# Patient Record
Sex: MALE | Race: WHITE | NOT HISPANIC OR LATINO
[De-identification: names, ages, dates, MRNs, and addresses within clinical notes are randomized per-mention and may not be internally consistent; named-entity substitution may affect disease eponyms.]

---

## 2018-10-02 ENCOUNTER — APPOINTMENT (OUTPATIENT)
Dept: UROLOGY | Facility: CLINIC | Age: 63
End: 2018-10-02
Payer: COMMERCIAL

## 2018-10-02 VITALS
SYSTOLIC BLOOD PRESSURE: 144 MMHG | DIASTOLIC BLOOD PRESSURE: 80 MMHG | HEIGHT: 70 IN | BODY MASS INDEX: 34.36 KG/M2 | RESPIRATION RATE: 17 BRPM | TEMPERATURE: 98.3 F | HEART RATE: 76 BPM | WEIGHT: 240 LBS

## 2018-10-02 PROCEDURE — 99204 OFFICE O/P NEW MOD 45 MIN: CPT

## 2018-10-10 LAB
CORE LAB FLUID CYTOLOGY: NORMAL
PSA SERPL-MCNC: 0.05 NG/ML

## 2018-10-23 ENCOUNTER — APPOINTMENT (OUTPATIENT)
Dept: UROLOGY | Facility: CLINIC | Age: 63
End: 2018-10-23

## 2018-11-21 ENCOUNTER — APPOINTMENT (OUTPATIENT)
Dept: GASTROENTEROLOGY | Facility: CLINIC | Age: 63
End: 2018-11-21
Payer: COMMERCIAL

## 2018-11-21 VITALS
RESPIRATION RATE: 17 BRPM | BODY MASS INDEX: 36.22 KG/M2 | WEIGHT: 253 LBS | HEART RATE: 89 BPM | OXYGEN SATURATION: 95 % | HEIGHT: 70 IN | DIASTOLIC BLOOD PRESSURE: 86 MMHG | SYSTOLIC BLOOD PRESSURE: 135 MMHG | TEMPERATURE: 97.9 F

## 2018-11-21 DIAGNOSIS — Z86.010 PERSONAL HISTORY OF COLONIC POLYPS: ICD-10-CM

## 2018-11-21 DIAGNOSIS — Z82.49 FAMILY HISTORY OF ISCHEMIC HEART DISEASE AND OTHER DISEASES OF THE CIRCULATORY SYSTEM: ICD-10-CM

## 2018-11-21 PROCEDURE — 99244 OFF/OP CNSLTJ NEW/EST MOD 40: CPT

## 2018-12-12 ENCOUNTER — LABORATORY RESULT (OUTPATIENT)
Age: 63
End: 2018-12-12

## 2018-12-12 ENCOUNTER — APPOINTMENT (OUTPATIENT)
Dept: NEPHROLOGY | Facility: CLINIC | Age: 63
End: 2018-12-12
Payer: COMMERCIAL

## 2018-12-12 VITALS
DIASTOLIC BLOOD PRESSURE: 88 MMHG | SYSTOLIC BLOOD PRESSURE: 133 MMHG | HEART RATE: 81 BPM | HEIGHT: 70 IN | BODY MASS INDEX: 35.82 KG/M2 | OXYGEN SATURATION: 97 % | WEIGHT: 250.22 LBS

## 2018-12-12 VITALS — SYSTOLIC BLOOD PRESSURE: 130 MMHG | DIASTOLIC BLOOD PRESSURE: 80 MMHG

## 2018-12-12 DIAGNOSIS — I10 ESSENTIAL (PRIMARY) HYPERTENSION: ICD-10-CM

## 2018-12-12 DIAGNOSIS — R80.9 PROTEINURIA, UNSPECIFIED: ICD-10-CM

## 2018-12-12 DIAGNOSIS — R60.0 LOCALIZED EDEMA: ICD-10-CM

## 2018-12-12 DIAGNOSIS — N18.3 CHRONIC KIDNEY DISEASE, STAGE 3 (MODERATE): ICD-10-CM

## 2018-12-12 DIAGNOSIS — R31.29 OTHER MICROSCOPIC HEMATURIA: ICD-10-CM

## 2018-12-12 PROCEDURE — G0008: CPT | Mod: GC

## 2018-12-12 PROCEDURE — 90686 IIV4 VACC NO PRSV 0.5 ML IM: CPT | Mod: GC

## 2018-12-12 PROCEDURE — 99204 OFFICE O/P NEW MOD 45 MIN: CPT | Mod: 25,GC

## 2018-12-12 RX ORDER — EZETIMIBE 10 MG/1
10 TABLET ORAL
Refills: 0 | Status: ACTIVE | COMMUNITY

## 2018-12-12 RX ORDER — SODIUM PICOSULFATE, MAGNESIUM OXIDE, AND ANHYDROUS CITRIC ACID 10; 3.5; 12 MG/160ML; G/160ML; G/160ML
10-3.5-12 MG-GM LIQUID ORAL
Qty: 1 | Refills: 0 | Status: DISCONTINUED | COMMUNITY
Start: 2018-11-21 | End: 2018-12-12

## 2018-12-13 LAB
25(OH)D3 SERPL-MCNC: 19 NG/ML
ALBUMIN SERPL ELPH-MCNC: 3.8 G/DL
ANION GAP SERPL CALC-SCNC: 14 MMOL/L
APPEARANCE: CLEAR
BACTERIA: NEGATIVE
BASOPHILS # BLD AUTO: 0.02 K/UL
BASOPHILS NFR BLD AUTO: 0.3 %
BILIRUBIN URINE: NEGATIVE
BLOOD URINE: ABNORMAL
BUN SERPL-MCNC: 31 MG/DL
CALCIUM SERPL-MCNC: 9.5 MG/DL
CALCIUM SERPL-MCNC: 9.5 MG/DL
CHLORIDE SERPL-SCNC: 108 MMOL/L
CO2 SERPL-SCNC: 20 MMOL/L
COLOR: YELLOW
CREAT SERPL-MCNC: 1.48 MG/DL
CREAT SPEC-SCNC: 124 MG/DL
CREAT/PROT UR: 1.9 RATIO
EOSINOPHIL # BLD AUTO: 0.14 K/UL
EOSINOPHIL NFR BLD AUTO: 1.9 %
GLUCOSE QUALITATIVE U: NEGATIVE MG/DL
GLUCOSE SERPL-MCNC: 111 MG/DL
HCT VFR BLD CALC: 42.3 %
HGB BLD-MCNC: 13.7 G/DL
HYALINE CASTS: 3 /LPF
IMM GRANULOCYTES NFR BLD AUTO: 0.3 %
KETONES URINE: NEGATIVE
LEUKOCYTE ESTERASE URINE: NEGATIVE
LYMPHOCYTES # BLD AUTO: 1.84 K/UL
LYMPHOCYTES NFR BLD AUTO: 24.3 %
MAN DIFF?: NORMAL
MCHC RBC-ENTMCNC: 29.7 PG
MCHC RBC-ENTMCNC: 32.4 GM/DL
MCV RBC AUTO: 91.6 FL
MICROSCOPIC-UA: NORMAL
MONOCYTES # BLD AUTO: 0.56 K/UL
MONOCYTES NFR BLD AUTO: 7.4 %
NEUTROPHILS # BLD AUTO: 4.98 K/UL
NEUTROPHILS NFR BLD AUTO: 65.8 %
NITRITE URINE: NEGATIVE
PARATHYROID HORMONE INTACT: 71 PG/ML
PH URINE: 5.5
PHOSPHATE SERPL-MCNC: 3.7 MG/DL
PLATELET # BLD AUTO: 209 K/UL
POTASSIUM SERPL-SCNC: 4.4 MMOL/L
PROT UR-MCNC: 240 MG/DL
PROTEIN URINE: 300 MG/DL
RBC # BLD: 4.62 M/UL
RBC # FLD: 14.1 %
RED BLOOD CELLS URINE: 6 /HPF
SODIUM SERPL-SCNC: 142 MMOL/L
SPECIFIC GRAVITY URINE: 1.02
SQUAMOUS EPITHELIAL CELLS: 0 /HPF
UROBILINOGEN URINE: NEGATIVE MG/DL
WBC # FLD AUTO: 7.56 K/UL
WHITE BLOOD CELLS URINE: 1 /HPF

## 2018-12-17 ENCOUNTER — OTHER (OUTPATIENT)
Age: 63
End: 2018-12-17

## 2018-12-20 ENCOUNTER — FORM ENCOUNTER (OUTPATIENT)
Age: 63
End: 2018-12-20

## 2018-12-21 ENCOUNTER — APPOINTMENT (OUTPATIENT)
Dept: MRI IMAGING | Facility: CLINIC | Age: 63
End: 2018-12-21
Payer: COMMERCIAL

## 2018-12-21 ENCOUNTER — OUTPATIENT (OUTPATIENT)
Dept: OUTPATIENT SERVICES | Facility: HOSPITAL | Age: 63
LOS: 1 days | End: 2018-12-21
Payer: COMMERCIAL

## 2018-12-21 DIAGNOSIS — Z00.8 ENCOUNTER FOR OTHER GENERAL EXAMINATION: ICD-10-CM

## 2018-12-21 PROCEDURE — 70200 X-RAY EXAM OF EYE SOCKETS: CPT | Mod: 26

## 2018-12-21 PROCEDURE — A9585: CPT

## 2018-12-21 PROCEDURE — 74183 MRI ABD W/O CNTR FLWD CNTR: CPT

## 2018-12-21 PROCEDURE — 70200 X-RAY EXAM OF EYE SOCKETS: CPT

## 2018-12-21 PROCEDURE — 74183 MRI ABD W/O CNTR FLWD CNTR: CPT | Mod: 26

## 2018-12-21 PROCEDURE — 72197 MRI PELVIS W/O & W/DYE: CPT | Mod: 26

## 2018-12-21 PROCEDURE — 72197 MRI PELVIS W/O & W/DYE: CPT

## 2018-12-29 ENCOUNTER — APPOINTMENT (OUTPATIENT)
Dept: MRI IMAGING | Facility: IMAGING CENTER | Age: 63
End: 2018-12-29

## 2019-02-12 ENCOUNTER — APPOINTMENT (OUTPATIENT)
Dept: GASTROENTEROLOGY | Facility: HOSPITAL | Age: 64
End: 2019-02-12
Payer: COMMERCIAL

## 2019-02-12 ENCOUNTER — RESULT REVIEW (OUTPATIENT)
Age: 64
End: 2019-02-12

## 2019-02-12 ENCOUNTER — OUTPATIENT (OUTPATIENT)
Dept: OUTPATIENT SERVICES | Facility: HOSPITAL | Age: 64
LOS: 1 days | Discharge: HOME | End: 2019-02-12
Payer: COMMERCIAL

## 2019-02-12 VITALS
HEIGHT: 70 IN | OXYGEN SATURATION: 97 % | HEART RATE: 60 BPM | TEMPERATURE: 97 F | SYSTOLIC BLOOD PRESSURE: 136 MMHG | RESPIRATION RATE: 12 BRPM | DIASTOLIC BLOOD PRESSURE: 85 MMHG | WEIGHT: 253.09 LBS

## 2019-02-12 VITALS
HEART RATE: 62 BPM | DIASTOLIC BLOOD PRESSURE: 82 MMHG | OXYGEN SATURATION: 100 % | SYSTOLIC BLOOD PRESSURE: 150 MMHG | RESPIRATION RATE: 18 BRPM

## 2019-02-12 PROCEDURE — 45384 COLONOSCOPY W/LESION REMOVAL: CPT | Mod: 33

## 2019-02-12 PROCEDURE — 88305 TISSUE EXAM BY PATHOLOGIST: CPT | Mod: 26

## 2019-02-12 RX ORDER — SODIUM CHLORIDE 9 MG/ML
1000 INJECTION, SOLUTION INTRAVENOUS
Qty: 0 | Refills: 0 | Status: DISCONTINUED | OUTPATIENT
Start: 2019-02-12 | End: 2019-02-12

## 2019-02-12 RX ADMIN — SODIUM CHLORIDE 50 MILLILITER(S): 9 INJECTION, SOLUTION INTRAVENOUS at 11:36

## 2019-02-12 NOTE — ASU DISCHARGE PLAN (ADULT/PEDIATRIC) - NURSING INSTRUCTIONS
If you wear contact lenses, please refrain from wearing contact lenses for the next 24 hours to avoid corneal abrasion due to possible dry eyes from surgery. Wear corrective lenses. Otherwise, your eyes may be dry for the next 24 hours. DO NOT rub them. You may use eye drops to moisturize.

## 2019-02-12 NOTE — ASU PATIENT PROFILE, ADULT - PSH
H/O bilateral cataract extraction    Parotid gland enlargement  s/p removed 22 years ago  S/P eye surgery  right eye retina  S/P hernia repair    S/P prostatectomy

## 2019-02-12 NOTE — ASU DISCHARGE PLAN (ADULT/PEDIATRIC) - CALL YOUR DOCTOR IF YOU HAVE ANY OF THE FOLLOWING:
Bleeding that does not stop/Nausea and vomiting that does not stop/Increased irritability or sluggishness Nausea and vomiting that does not stop/Unable to urinate/Bleeding that does not stop/Fever greater than (need to indicate Fahrenheit or Celsius)/Increased irritability or sluggishness/Signs of infection: redness around surgical site, hot and tender to the touch, pus drainage of any kind accompanied by foul odor with a temperature of 101.0, or higher.

## 2019-02-12 NOTE — ASU DISCHARGE PLAN (ADULT/PEDIATRIC) - CONDITION AT DISCHARGE
Patient is stable and meets discharge criteria. Patient made aware that he/she must wait on unit to be escorted to awaiting car in front of the main building after being discharged by Anesthesia Department./Stable

## 2019-02-14 DIAGNOSIS — Z12.11 ENCOUNTER FOR SCREENING FOR MALIGNANT NEOPLASM OF COLON: ICD-10-CM

## 2019-02-14 DIAGNOSIS — K64.8 OTHER HEMORRHOIDS: ICD-10-CM

## 2019-02-14 DIAGNOSIS — D12.3 BENIGN NEOPLASM OF TRANSVERSE COLON: ICD-10-CM

## 2019-02-14 DIAGNOSIS — I12.9 HYPERTENSIVE CHRONIC KIDNEY DISEASE WITH STAGE 1 THROUGH STAGE 4 CHRONIC KIDNEY DISEASE, OR UNSPECIFIED CHRONIC KIDNEY DISEASE: ICD-10-CM

## 2019-02-14 DIAGNOSIS — N18.1 CHRONIC KIDNEY DISEASE, STAGE 1: ICD-10-CM

## 2019-02-14 DIAGNOSIS — Z90.79 ACQUIRED ABSENCE OF OTHER GENITAL ORGAN(S): ICD-10-CM

## 2019-02-14 DIAGNOSIS — D12.2 BENIGN NEOPLASM OF ASCENDING COLON: ICD-10-CM

## 2019-02-14 DIAGNOSIS — Z85.46 PERSONAL HISTORY OF MALIGNANT NEOPLASM OF PROSTATE: ICD-10-CM

## 2019-02-14 DIAGNOSIS — N39.3 STRESS INCONTINENCE (FEMALE) (MALE): ICD-10-CM

## 2019-02-14 DIAGNOSIS — E55.9 VITAMIN D DEFICIENCY, UNSPECIFIED: ICD-10-CM

## 2019-03-21 ENCOUNTER — RESULT REVIEW (OUTPATIENT)
Age: 64
End: 2019-03-21

## 2019-03-21 LAB
ALBUMIN MFR SERPL ELPH: 62 %
ALBUMIN SERPL-MCNC: 4.2 G/DL
ALBUMIN/GLOB SERPL: 1.7 RATIO
ALPHA1 GLOB MFR SERPL ELPH: 4.3 %
ALPHA1 GLOB SERPL ELPH-MCNC: 0.3 G/DL
ALPHA2 GLOB MFR SERPL ELPH: 12.4 %
ALPHA2 GLOB SERPL ELPH-MCNC: 0.8 G/DL
ANA SER IF-ACNC: NEGATIVE
ANCA AB SER-IMP: ABNORMAL
B-GLOBULIN MFR SERPL ELPH: 10.8 %
B-GLOBULIN SERPL ELPH-MCNC: 0.7 G/DL
B19V DNA FLD QL NAA+PROBE: NOT DETECTED IU/ML
C-ANCA SER-ACNC: NEGATIVE
C3 SERPL-MCNC: 148 MG/DL
C4 SERPL-MCNC: 31 MG/DL
DEPRECATED KAPPA LC FREE/LAMBDA SER: 1.69 RATIO
DSDNA AB SER-ACNC: <12 IU/ML
ENA SS-A AB SER IA-ACNC: <0.2 AL
ENA SS-B AB SER IA-ACNC: <0.2 AL
GAMMA GLOB FLD ELPH-MCNC: 0.7 G/DL
GAMMA GLOB MFR SERPL ELPH: 10.5 %
HIV1+2 AB SPEC QL IA.RAPID: NONREACTIVE
INTERPRETATION SERPL IEP-IMP: NORMAL
KAPPA LC 24H UR QL: NORMAL
KAPPA LC CSF-MCNC: 2.09 MG/DL
KAPPA LC SERPL-MCNC: 3.53 MG/DL
M PROTEIN SPEC IFE-MCNC: NORMAL
MPO AB + PR3 PNL SER: NORMAL
P-ANCA TITR SER IF: NEGATIVE
PROT SERPL-MCNC: 6.7 G/DL
PROT SERPL-MCNC: 6.7 G/DL
PROTEINASE3 AB SER IA-ACNC: <5 UNITS
PROTEINASE3 AB SER-ACNC: NEGATIVE
RHEUMATOID FACT SER QL: <10 IU/ML
RPR SER-TITR: NORMAL
T PALLIDUM AB SER QL IA: NEGATIVE

## 2020-03-27 ENCOUNTER — INPATIENT (INPATIENT)
Facility: HOSPITAL | Age: 65
LOS: 5 days | Discharge: HOME | DRG: 871 | End: 2020-04-02
Attending: INTERNAL MEDICINE | Admitting: HOSPITALIST
Payer: COMMERCIAL

## 2020-03-27 VITALS
HEART RATE: 99 BPM | OXYGEN SATURATION: 97 % | HEIGHT: 70 IN | RESPIRATION RATE: 20 BRPM | TEMPERATURE: 101 F | DIASTOLIC BLOOD PRESSURE: 78 MMHG | SYSTOLIC BLOOD PRESSURE: 125 MMHG | WEIGHT: 240.08 LBS

## 2020-03-27 DIAGNOSIS — Z29.9 ENCOUNTER FOR PROPHYLACTIC MEASURES, UNSPECIFIED: ICD-10-CM

## 2020-03-27 DIAGNOSIS — N18.3 CHRONIC KIDNEY DISEASE, STAGE 3 (MODERATE): ICD-10-CM

## 2020-03-27 DIAGNOSIS — B34.9 VIRAL INFECTION, UNSPECIFIED: ICD-10-CM

## 2020-03-27 DIAGNOSIS — R55 SYNCOPE AND COLLAPSE: ICD-10-CM

## 2020-03-27 DIAGNOSIS — I10 ESSENTIAL (PRIMARY) HYPERTENSION: ICD-10-CM

## 2020-03-27 DIAGNOSIS — Z02.9 ENCOUNTER FOR ADMINISTRATIVE EXAMINATIONS, UNSPECIFIED: ICD-10-CM

## 2020-03-27 DIAGNOSIS — E78.5 HYPERLIPIDEMIA, UNSPECIFIED: ICD-10-CM

## 2020-03-27 LAB
BASE EXCESS BLDV CALC-SCNC: -5.3 MMOL/L — LOW (ref -2–2)
BASOPHILS # BLD AUTO: 0.02 K/UL — SIGNIFICANT CHANGE UP (ref 0–0.2)
BASOPHILS NFR BLD AUTO: 0.3 % — SIGNIFICANT CHANGE UP (ref 0–2)
CA-I SERPL-SCNC: 1.27 MMOL/L — SIGNIFICANT CHANGE UP (ref 1.12–1.3)
CHLORIDE BLDV-SCNC: 110 MMOL/L — HIGH (ref 96–108)
CO2 BLDV-SCNC: 22 MMOL/L — SIGNIFICANT CHANGE UP (ref 22–30)
CRP SERPL-MCNC: 3.01 MG/DL — HIGH (ref 0–0.4)
EOSINOPHIL # BLD AUTO: 0 K/UL — SIGNIFICANT CHANGE UP (ref 0–0.5)
EOSINOPHIL NFR BLD AUTO: 0 % — SIGNIFICANT CHANGE UP (ref 0–6)
FERRITIN SERPL-MCNC: 211 NG/ML — SIGNIFICANT CHANGE UP (ref 30–400)
GAS PNL BLDV: 141 MMOL/L — SIGNIFICANT CHANGE UP (ref 135–145)
GAS PNL BLDV: SIGNIFICANT CHANGE UP
GAS PNL BLDV: SIGNIFICANT CHANGE UP
GLUCOSE BLDV-MCNC: 168 MG/DL — HIGH (ref 70–99)
HCO3 BLDV-SCNC: 20 MMOL/L — LOW (ref 21–29)
HCT VFR BLD CALC: 40.1 % — SIGNIFICANT CHANGE UP (ref 39–50)
HCT VFR BLDA CALC: 32 % — LOW (ref 39–50)
HGB BLD CALC-MCNC: 10.5 G/DL — LOW (ref 13–17)
HGB BLD-MCNC: 12.3 G/DL — LOW (ref 13–17)
IMM GRANULOCYTES NFR BLD AUTO: 0.7 % — SIGNIFICANT CHANGE UP (ref 0–1.5)
LACTATE BLDV-MCNC: 3.2 MMOL/L — HIGH (ref 0.7–2)
LYMPHOCYTES # BLD AUTO: 0.92 K/UL — LOW (ref 1–3.3)
LYMPHOCYTES # BLD AUTO: 15.8 % — SIGNIFICANT CHANGE UP (ref 13–44)
MCHC RBC-ENTMCNC: 27.9 PG — SIGNIFICANT CHANGE UP (ref 27–34)
MCHC RBC-ENTMCNC: 30.7 GM/DL — LOW (ref 32–36)
MCV RBC AUTO: 90.9 FL — SIGNIFICANT CHANGE UP (ref 80–100)
MONOCYTES # BLD AUTO: 0.28 K/UL — SIGNIFICANT CHANGE UP (ref 0–0.9)
MONOCYTES NFR BLD AUTO: 4.8 % — SIGNIFICANT CHANGE UP (ref 2–14)
NEUTROPHILS # BLD AUTO: 4.57 K/UL — SIGNIFICANT CHANGE UP (ref 1.8–7.4)
NEUTROPHILS NFR BLD AUTO: 78.4 % — HIGH (ref 43–77)
NRBC # BLD: 0 /100 WBCS — SIGNIFICANT CHANGE UP (ref 0–0)
PCO2 BLDV: 43 MMHG — SIGNIFICANT CHANGE UP (ref 35–50)
PH BLDV: 7.29 — LOW (ref 7.35–7.45)
PLATELET # BLD AUTO: 141 K/UL — LOW (ref 150–400)
PO2 BLDV: 41 MMHG — SIGNIFICANT CHANGE UP (ref 25–45)
POTASSIUM BLDV-SCNC: 3.9 MMOL/L — SIGNIFICANT CHANGE UP (ref 3.5–5.3)
PROCALCITONIN SERPL-MCNC: 0.27 NG/ML — HIGH (ref 0.02–0.1)
RBC # BLD: 4.41 M/UL — SIGNIFICANT CHANGE UP (ref 4.2–5.8)
RBC # FLD: 14.3 % — SIGNIFICANT CHANGE UP (ref 10.3–14.5)
SAO2 % BLDV: 66 % — LOW (ref 67–88)
SARS-COV-2 RNA SPEC QL NAA+PROBE: DETECTED
TROPONIN T, HIGH SENSITIVITY RESULT: 16 NG/L — SIGNIFICANT CHANGE UP (ref 0–51)
TROPONIN T, HIGH SENSITIVITY RESULT: 19 NG/L — SIGNIFICANT CHANGE UP (ref 0–51)
WBC # BLD: 5.83 K/UL — SIGNIFICANT CHANGE UP (ref 3.8–10.5)
WBC # FLD AUTO: 5.83 K/UL — SIGNIFICANT CHANGE UP (ref 3.8–10.5)

## 2020-03-27 PROCEDURE — 93010 ELECTROCARDIOGRAM REPORT: CPT | Mod: NC

## 2020-03-27 PROCEDURE — 99285 EMERGENCY DEPT VISIT HI MDM: CPT

## 2020-03-27 PROCEDURE — 93010 ELECTROCARDIOGRAM REPORT: CPT

## 2020-03-27 PROCEDURE — 71045 X-RAY EXAM CHEST 1 VIEW: CPT | Mod: 26

## 2020-03-27 PROCEDURE — 99223 1ST HOSP IP/OBS HIGH 75: CPT

## 2020-03-27 RX ORDER — ATORVASTATIN CALCIUM 80 MG/1
40 TABLET, FILM COATED ORAL AT BEDTIME
Refills: 0 | Status: DISCONTINUED | OUTPATIENT
Start: 2020-03-27 | End: 2020-04-02

## 2020-03-27 RX ORDER — HEPARIN SODIUM 5000 [USP'U]/ML
5000 INJECTION INTRAVENOUS; SUBCUTANEOUS EVERY 8 HOURS
Refills: 0 | Status: DISCONTINUED | OUTPATIENT
Start: 2020-03-27 | End: 2020-03-28

## 2020-03-27 RX ORDER — EZETIMIBE 10 MG/1
1 TABLET ORAL
Qty: 0 | Refills: 0 | DISCHARGE

## 2020-03-27 RX ORDER — ATORVASTATIN CALCIUM 80 MG/1
1 TABLET, FILM COATED ORAL
Qty: 0 | Refills: 0 | DISCHARGE

## 2020-03-27 RX ORDER — ACETAMINOPHEN 500 MG
650 TABLET ORAL ONCE
Refills: 0 | Status: COMPLETED | OUTPATIENT
Start: 2020-03-27 | End: 2020-03-27

## 2020-03-27 RX ORDER — LOSARTAN POTASSIUM 100 MG/1
1 TABLET, FILM COATED ORAL
Qty: 0 | Refills: 0 | DISCHARGE

## 2020-03-27 RX ORDER — ACETAMINOPHEN 500 MG
650 TABLET ORAL EVERY 6 HOURS
Refills: 0 | Status: DISCONTINUED | OUTPATIENT
Start: 2020-03-27 | End: 2020-04-02

## 2020-03-27 RX ORDER — SODIUM CHLORIDE 9 MG/ML
1000 INJECTION, SOLUTION INTRAVENOUS ONCE
Refills: 0 | Status: COMPLETED | OUTPATIENT
Start: 2020-03-27 | End: 2020-03-27

## 2020-03-27 RX ADMIN — Medication 650 MILLIGRAM(S): at 23:14

## 2020-03-27 RX ADMIN — SODIUM CHLORIDE 1000 MILLILITER(S): 9 INJECTION, SOLUTION INTRAVENOUS at 10:00

## 2020-03-27 RX ADMIN — HEPARIN SODIUM 5000 UNIT(S): 5000 INJECTION INTRAVENOUS; SUBCUTANEOUS at 22:44

## 2020-03-27 RX ADMIN — Medication 650 MILLIGRAM(S): at 15:42

## 2020-03-27 RX ADMIN — ATORVASTATIN CALCIUM 40 MILLIGRAM(S): 80 TABLET, FILM COATED ORAL at 22:43

## 2020-03-27 RX ADMIN — Medication 650 MILLIGRAM(S): at 14:42

## 2020-03-27 RX ADMIN — SODIUM CHLORIDE 1000 MILLILITER(S): 9 INJECTION, SOLUTION INTRAVENOUS at 11:00

## 2020-03-27 RX ADMIN — SODIUM CHLORIDE 1000 MILLILITER(S): 9 INJECTION, SOLUTION INTRAVENOUS at 12:10

## 2020-03-27 RX ADMIN — Medication 200 MILLIGRAM(S): at 18:33

## 2020-03-27 RX ADMIN — Medication 650 MILLIGRAM(S): at 22:44

## 2020-03-27 NOTE — H&P ADULT - PROBLEM SELECTOR PLAN 2
- COVID 19 PCR positive  - presenting with cough, fever, diarrhea, syncopal episode  - CXR w/clear lungs  - saturating 97% on room air  - will check procalcitonin/crp/ferritin  - monitor O2 sats  - supportive care ; guaifenesin, tylenol prn

## 2020-03-27 NOTE — ED PROVIDER NOTE - ATTENDING CONTRIBUTION TO CARE
65 y/o m with pmhx HTN, HLD presents for concern of syncope. occurred 2 times at home. also  having assocaited fever tm 102.6 and cough which has resolved. wife with similar symptoms and daughter at home as well. syncope occurred on Wednesday and then a brief e.pisode of presyncopal sx yesterday.   nausea and vomiting no diarrhea. food no taste. No recent travel. + sick contact in daughter with similar symptoms.  dminished appetite  Gen.  no acute distress  HEENT:  perrl eomi  Lungs:  b/l bs  CVS: S1S2   Abd;  soft non tender no distention  Ext:  no pitting edema or erythema  Neuro: aaox3 no focal deficits  MSK:  strength 5/5 b/l upper and lower ext.

## 2020-03-27 NOTE — H&P ADULT - ASSESSMENT
64M with h/o HTN, HLD with c/o cough, fever , intermittent non bloody diarrhea and syncope in the setting of COVID 19 infection.

## 2020-03-27 NOTE — ED PROVIDER NOTE - CLINICAL SUMMARY MEDICAL DECISION MAKING FREE TEXT BOX
64M p/w cough, fever, diarrhea, and syncope. VSS, not hypoxic. in NAD. Syncope likely 2/2 general decompensation from viral illness given pt has no cardiac history or symptoms. Concern for covid. Will check labs, covid swab, cxr, vbg, ekg, trop. Likely tba. 64M p/w cough, fever, diarrhea, and syncope. VSS, not hypoxic. in NAD. Syncope likely 2/2 general decompensation from viral illness given pt has no cardiac history or symptoms. Concern for covid. Will check labs, covid swab, cxr, vbg, ekg, trop. Likely tba.  ATTG: : sycope with uri symptoms and fever concern for COVID coinfection check labs, check xray check cardiac work up, re eufemia for dispo

## 2020-03-27 NOTE — ED PROVIDER NOTE - OBJECTIVE STATEMENT
64M hx htn, hld, p/w 5 days of symptoms including fever (high of 102.6 yesterday - pt took excedrin prior to coming) and intermittent nb diarrhea for ~5 days, cough for first 3 days during that fever,  and 1 episode of syncope while at home Wednesday and then a brief e.pisode of presyncopal sx yesterday.  Pt says since symptoms began he has lost his sense of smell and taste. Per pt was ambulating in his house wednesday when he felt faint and his vision went dark and he passed out - he's not sure how long he was out for. Did not fall hard/ hit his head. Did lose control of his bowels. No recent travel. + sick contact in daughter with similar symptoms. Pt last (nuclear) stress tested in [facility] - was normal per pt. 64M hx htn, hld, p/w 5 days of symptoms including fever (high of 102.6 yesterday - pt took excedrin prior to coming) and intermittent nb diarrhea for ~5 days, cough for first 3 days during that fever,  and 1 episode of syncope while at home Wednesday and then a brief e.pisode of presyncopal sx yesterday.  Pt says since symptoms began he has lost his sense of smell and taste. Per pt was ambulating in his house wednesday when he felt faint and his vision went dark and he passed out - he's not sure how long he was out for. Did not fall hard/ hit his head. Did lose control of his bowels. No recent travel. + sick contact in daughter with similar symptoms. Pt last (nuclear) stress tested in [facility] - was normal per pt.    pmd: Magnus Gruber MD

## 2020-03-27 NOTE — ED ADULT NURSE NOTE - PMH
Dyslipidemia    Hypertension    Prostate cancer Dyslipidemia    Former smoker    Hypertension    Prostate cancer

## 2020-03-27 NOTE — ED ADULT NURSE NOTE - OBJECTIVE STATEMENT
64 year old male, A+Ox3, presents with wife and daughter to ED to be seen.  His exposure began 2 weeks ago when his daughter came home with a dry cough.  Pt states that his sx started with a dry cough that lasted 3 days then the cough stopped and progressed to persistent fevers. Pt describes a unwitnessed syncopal episode on 3/25/20, where he passed out inside his home with full incontinence. Denies head trauma. Reports feeling weak and seeing "black spots" prior to passing out.  Pt states yesterday he was sitting down watching TV and started seeing black spots but didn't pass out.  Reports decreased in taste and smell and had light diarrhea last night. Highest documented temperature at home was 102.6 and has been using Exedrin due to fever/headaches. No nuchal rigidity noted. denies neck , joint pain, chest pain. no body aches. Lungs clear to auscultation, reports feeling "winded at rest."  No cough present. Abd soft nontender, nondistended. No nause/vomiting. Decrease appetitive. skin w/d/i.  COVID19 swabbed in ED. placed on cardiac monitor.

## 2020-03-27 NOTE — ED ADULT NURSE NOTE - CHIEF COMPLAINT
The patient is a 64y Male complaining of The patient is a 64y Male complaining of fever/cough/diarrhea/syncope

## 2020-03-27 NOTE — H&P ADULT - PROBLEM SELECTOR PLAN 6
Transitions of Care Status:  1.  Name of PCP: Magnus Gruber  2.  PCP Contacted on Admission: [ ] Y    [ ] N    3.  PCP contacted at Discharge: [ ] Y    [ ] N    [ ] N/A  4.  Post-Discharge Appointment Date and Location:  5.  Summary of Handoff given to PCP: heparin sc for dvt ppx

## 2020-03-27 NOTE — H&P ADULT - PROBLEM SELECTOR PLAN 1
- in the setting of COVID 19 infection  - his last nuclear stress test was in Dec 2019 and was normal per pt.  - c/w telemetry monitoring  - check EKG

## 2020-03-27 NOTE — ED ADULT NURSE NOTE - NSIMPLEMENTINTERV_GEN_ALL_ED
Implemented All Fall Risk Interventions:  [place] to call system. Call bell, personal items and telephone within reach. Instruct patient to call for assistance. Room bathroom lighting operational. Non-slip footwear when patient is off stretcher. Physically safe environment: no spills, clutter or unnecessary equipment. Stretcher in lowest position, wheels locked, appropriate side rails in place. Provide visual cue, wrist band, yellow gown, etc. Monitor gait and stability. Monitor for mental status changes and reorient to person, place, and time. Review medications for side effects contributing to fall risk. Reinforce activity limits and safety measures with patient and family.

## 2020-03-27 NOTE — H&P ADULT - PROBLEM SELECTOR PLAN 7
Transitions of Care Status:  1.  Name of PCP: Magnus Gruber  2.  PCP Contacted on Admission: [ ] Y    [ ] N    3.  PCP contacted at Discharge: [ ] Y    [ ] N    [ ] N/A  4.  Post-Discharge Appointment Date and Location:  5.  Summary of Handoff given to PCP:

## 2020-03-27 NOTE — H&P ADULT - HISTORY OF PRESENT ILLNESS
64M hx htn, hld, p/w 5 days of symptoms including fever (high of 102.6 yesterday - pt took excedrin prior to coming) and intermittent nb diarrhea for ~5 days, cough for first 3 days during that fever,  and 1 episode of syncope while at home Wednesday and then a brief e.pisode of presyncopal sx yesterday.  Pt says since symptoms began he has lost his sense of smell and taste. Per pt was ambulating in his house wednesday when he felt faint and his vision went dark and he passed out - he's not sure how long he was out for. Did not fall hard/ hit his head. Did lose control of his bowels. No recent travel. + sick contact in daughter with similar symptoms. Pt last (nuclear) stress tested in [facility] - was normal per pt. 64M with h/o HTN, HLD with c/o cough, fever ( Tmax 102F yesterday), intermittent non bloody diarrhea x 5 days. Of note he had 1 episode of esyncope while at home 2 days ago and then a brief episode of presyncopal symptoms yesterday. Pt reports that he has lost his sense of smell and taste since symptoms began. Pt denies hitting his head during syncopal episode. He is unsure how long he had LOC. He did lose control of his bowels.     Pt denies any recent travel but reports one sick contact ; his daughter. His wife also has similar symptoms. His last nuclear stress test was in Dec 2019 and was normal per pt.    Currently he c/o mild sob, cough and headache.

## 2020-03-27 NOTE — H&P ADULT - NSICDXPASTSURGICALHX_GEN_ALL_CORE_FT
PAST SURGICAL HISTORY:  H/O bilateral cataract extraction     Parotid gland enlargement s/p removed 22 years ago    S/P eye surgery right eye retina    S/P hernia repair     S/P prostatectomy

## 2020-03-28 DIAGNOSIS — I48.0 PAROXYSMAL ATRIAL FIBRILLATION: ICD-10-CM

## 2020-03-28 DIAGNOSIS — R55 SYNCOPE AND COLLAPSE: ICD-10-CM

## 2020-03-28 LAB
ALBUMIN SERPL ELPH-MCNC: 3.2 G/DL — LOW (ref 3.3–5)
ALP SERPL-CCNC: 58 U/L — SIGNIFICANT CHANGE UP (ref 40–120)
ALT FLD-CCNC: 25 U/L — SIGNIFICANT CHANGE UP (ref 10–45)
ANION GAP SERPL CALC-SCNC: 14 MMOL/L — SIGNIFICANT CHANGE UP (ref 5–17)
AST SERPL-CCNC: 30 U/L — SIGNIFICANT CHANGE UP (ref 10–40)
BILIRUB SERPL-MCNC: 0.2 MG/DL — SIGNIFICANT CHANGE UP (ref 0.2–1.2)
BUN SERPL-MCNC: 30 MG/DL — HIGH (ref 7–23)
CALCIUM SERPL-MCNC: 8.6 MG/DL — SIGNIFICANT CHANGE UP (ref 8.4–10.5)
CHLORIDE SERPL-SCNC: 107 MMOL/L — SIGNIFICANT CHANGE UP (ref 96–108)
CO2 SERPL-SCNC: 18 MMOL/L — LOW (ref 22–31)
CREAT SERPL-MCNC: 1.93 MG/DL — HIGH (ref 0.5–1.3)
GLUCOSE SERPL-MCNC: 99 MG/DL — SIGNIFICANT CHANGE UP (ref 70–99)
HCT VFR BLD CALC: 37.5 % — LOW (ref 39–50)
HCV AB S/CO SERPL IA: 0.13 S/CO — SIGNIFICANT CHANGE UP (ref 0–0.99)
HCV AB SERPL-IMP: SIGNIFICANT CHANGE UP
HGB BLD-MCNC: 11.9 G/DL — LOW (ref 13–17)
MAGNESIUM SERPL-MCNC: 1.7 MG/DL — SIGNIFICANT CHANGE UP (ref 1.6–2.6)
MCHC RBC-ENTMCNC: 28.5 PG — SIGNIFICANT CHANGE UP (ref 27–34)
MCHC RBC-ENTMCNC: 31.7 GM/DL — LOW (ref 32–36)
MCV RBC AUTO: 89.7 FL — SIGNIFICANT CHANGE UP (ref 80–100)
NRBC # BLD: 0 /100 WBCS — SIGNIFICANT CHANGE UP (ref 0–0)
PLATELET # BLD AUTO: 130 K/UL — LOW (ref 150–400)
POTASSIUM SERPL-MCNC: 4.2 MMOL/L — SIGNIFICANT CHANGE UP (ref 3.5–5.3)
POTASSIUM SERPL-SCNC: 4.2 MMOL/L — SIGNIFICANT CHANGE UP (ref 3.5–5.3)
PROT SERPL-MCNC: 6.2 G/DL — SIGNIFICANT CHANGE UP (ref 6–8.3)
RBC # BLD: 4.18 M/UL — LOW (ref 4.2–5.8)
RBC # FLD: 14.2 % — SIGNIFICANT CHANGE UP (ref 10.3–14.5)
SODIUM SERPL-SCNC: 139 MMOL/L — SIGNIFICANT CHANGE UP (ref 135–145)
WBC # BLD: 4.23 K/UL — SIGNIFICANT CHANGE UP (ref 3.8–10.5)
WBC # FLD AUTO: 4.23 K/UL — SIGNIFICANT CHANGE UP (ref 3.8–10.5)

## 2020-03-28 PROCEDURE — 93010 ELECTROCARDIOGRAM REPORT: CPT

## 2020-03-28 RX ORDER — APIXABAN 2.5 MG/1
5 TABLET, FILM COATED ORAL EVERY 12 HOURS
Refills: 0 | Status: DISCONTINUED | OUTPATIENT
Start: 2020-03-29 | End: 2020-04-02

## 2020-03-28 RX ORDER — METOPROLOL TARTRATE 50 MG
25 TABLET ORAL
Refills: 0 | Status: DISCONTINUED | OUTPATIENT
Start: 2020-03-28 | End: 2020-03-28

## 2020-03-28 RX ORDER — SODIUM CHLORIDE 9 MG/ML
1000 INJECTION INTRAMUSCULAR; INTRAVENOUS; SUBCUTANEOUS
Refills: 0 | Status: DISCONTINUED | OUTPATIENT
Start: 2020-03-28 | End: 2020-03-31

## 2020-03-28 RX ORDER — METOPROLOL TARTRATE 50 MG
50 TABLET ORAL
Refills: 0 | Status: DISCONTINUED | OUTPATIENT
Start: 2020-03-28 | End: 2020-04-02

## 2020-03-28 RX ORDER — ENOXAPARIN SODIUM 100 MG/ML
110 INJECTION SUBCUTANEOUS
Refills: 0 | Status: DISCONTINUED | OUTPATIENT
Start: 2020-03-28 | End: 2020-03-28

## 2020-03-28 RX ADMIN — ATORVASTATIN CALCIUM 40 MILLIGRAM(S): 80 TABLET, FILM COATED ORAL at 22:36

## 2020-03-28 RX ADMIN — Medication 25 MILLIGRAM(S): at 15:57

## 2020-03-28 RX ADMIN — HEPARIN SODIUM 5000 UNIT(S): 5000 INJECTION INTRAVENOUS; SUBCUTANEOUS at 05:59

## 2020-03-28 RX ADMIN — ENOXAPARIN SODIUM 110 MILLIGRAM(S): 100 INJECTION SUBCUTANEOUS at 15:54

## 2020-03-28 RX ADMIN — Medication 650 MILLIGRAM(S): at 14:45

## 2020-03-28 RX ADMIN — HEPARIN SODIUM 5000 UNIT(S): 5000 INJECTION INTRAVENOUS; SUBCUTANEOUS at 13:04

## 2020-03-28 RX ADMIN — SODIUM CHLORIDE 75 MILLILITER(S): 9 INJECTION INTRAMUSCULAR; INTRAVENOUS; SUBCUTANEOUS at 15:57

## 2020-03-28 NOTE — CHART NOTE - NSCHARTNOTEFT_GEN_A_CORE
Patient is 65 y/o male with PMHx HTN, HLD admitted for COVID-19 infection.   Notified by RN: Patient in new onset afib sustaining between 130-140 on tele.   Patient remains asymptomatic with stable vital signs. He denies CP, SOB, palpitations, nausea, lightheadedness.     Vital Signs Last 24 Hrs  T(C): 38.2 (28 Mar 2020 14:38), Max: 38.2 (27 Mar 2020 20:40)  T(F): 100.7 (28 Mar 2020 14:38), Max: 100.7 (27 Mar 2020 20:40)  HR: 94 (28 Mar 2020 12:45) (90 - 94)  BP: 108/85 (28 Mar 2020 14:38) (108/85 - 139/90)  RR: 22 (28 Mar 2020 14:38) (20 - 22)  SpO2: 96% (28 Mar 2020 14:38) (95% - 97%)    EKG: ; Afib with RVR; no other ST-T changes   Begin Metoprolol 25 mg bid; Lovenox 1mg/kg bid.   Begin IV fluids.   Dr. Yañez cardiologist consulted.   Continue to monitor patient on telemetry.   Discussed with Dr. Roberts.

## 2020-03-28 NOTE — CONSULT NOTE ADULT - PROBLEM SELECTOR RECOMMENDATION 9
increase Metoprolol 50 mg bid   Add Eliquis 5 bid   Will need outpatient Sleep study   Check thyroid panel

## 2020-03-28 NOTE — CONSULT NOTE ADULT - ASSESSMENT
64 M with h/o HTN, HLD with c/o cough, fever ( Tmax 102F yesterday), intermittent non bloody diarrhea x 5 days. Of note he had 1 episode of syncope while at home 2 days ago and then a brief episode of presyncopal symptoms yesterday. Pt reports that he has lost his sense of smell and taste since symptoms began. Pt denies hitting his head during syncopal episode. He is unsure how long he had LOC. He did lose control of his bowels.   Pt denies any recent travel but reports one sick contact ; his daughter. His wife also has similar symptoms. His last nuclear stress test was in Dec 2019 and was normal per pt.  he was found to be in new onset Afib earlier this afternoon.   Denies any known history of this. Denies chest pain or palpitations. Drinks few cups of coffee daily. Minimal alcohol intake.   Currently he c/o mild sob, cough and headache

## 2020-03-28 NOTE — CONSULT NOTE ADULT - SUBJECTIVE AND OBJECTIVE BOX
CHIEF COMPLAINT:  Afib       HISTORY OF PRESENT ILLNESS:  64 M with h/o HTN, HLD with c/o cough, fever ( Tmax 102F yesterday), intermittent non bloody diarrhea x 5 days. Of note he had 1 episode of syncope while at home 2 days ago and then a brief episode of presyncopal symptoms yesterday. Pt reports that he has lost his sense of smell and taste since symptoms began. Pt denies hitting his head during syncopal episode. He is unsure how long he had LOC. He did lose control of his bowels.   Pt denies any recent travel but reports one sick contact ; his daughter. His wife also has similar symptoms. His last nuclear stress test was in Dec 2019 and was normal per pt.  he was found to be in new onset Afib earlier this afternoon.   Denies any known history of this. Denies chest pain or palpitations. Drinks few cups of coffee daily. Minimal alcohol intake.   Currently he c/o mild sob, cough and headache    PAST MEDICAL & SURGICAL HISTORY:  Former smoker  Prostate cancer  Dyslipidemia  Hypertension  S/P eye surgery: right eye retina  S/P prostatectomy  H/O bilateral cataract extraction  Parotid gland enlargement: s/p removed 22 years ago  S/P hernia repair          MEDICATIONS:  enoxaparin Injectable 110 milliGRAM(s) SubCutaneous two times a day  metoprolol tartrate 25 milliGRAM(s) Oral two times a day      guaiFENesin   Syrup  (Sugar-Free) 200 milliGRAM(s) Oral every 6 hours PRN    acetaminophen   Tablet .. 650 milliGRAM(s) Oral every 6 hours PRN      atorvastatin 40 milliGRAM(s) Oral at bedtime    sodium chloride 0.9%. 1000 milliLiter(s) IV Continuous <Continuous>      FAMILY HISTORY:      SOCIAL HISTORY:    [ ] Non-smoker  [ ] Smoker  [ ] Alcohol    Allergies    No Known Allergies    Intolerances    	    REVIEW OF SYSTEMS:  CONSTITUTIONAL: + fever, weight loss, + fatigue  EYES: No eye pain, visual disturbances, or discharge  ENMT:  No difficulty hearing, tinnitus, vertigo; No sinus or throat pain  NECK: No pain or stiffness  RESPIRATORY: No cough, wheezing, chills or hemoptysis; No Shortness of Breath  CARDIOVASCULAR: No chest pain, palpitations, passing out, dizziness, or leg swelling  GASTROINTESTINAL: No abdominal or epigastric pain. No nausea, vomiting, or hematemesis; No diarrhea or constipation. No melena or hematochezia.  GENITOURINARY: No dysuria, frequency, hematuria, or incontinence  NEUROLOGICAL: No headaches, memory loss, loss of strength, numbness, or tremors  SKIN: No itching, burning, rashes, or lesions   LYMPH Nodes: No enlarged glands  ENDOCRINE: No heat or cold intolerance; No hair loss  MUSCULOSKELETAL: No joint pain or swelling; No muscle, back, or extremity pain  PSYCHIATRIC: No depression, anxiety, mood swings, or difficulty sleeping  HEME/LYMPH: No easy bruising, or bleeding gums  ALLERY AND IMMUNOLOGIC: No hives or eczema	    [ ] All others negative	  [ ] Unable to obtain    PHYSICAL EXAM:  T(C): 38.2 (03-28-20 @ 20:46), Max: 38.2 (03-28-20 @ 14:38)  HR: 104 (03-28-20 @ 20:46) (91 - 104)  BP: 125/63 (03-28-20 @ 20:46) (108/85 - 139/90)  RR: 22 (03-28-20 @ 20:46) (20 - 22)  SpO2: 97% (03-28-20 @ 20:46) (96% - 97%)  Wt(kg): --  I&O's Summary    27 Mar 2020 07:01  -  28 Mar 2020 07:00  --------------------------------------------------------  IN: 480 mL / OUT: 0 mL / NET: 480 mL    28 Mar 2020 07:01  -  28 Mar 2020 21:36  --------------------------------------------------------  IN: 1145 mL / OUT: 340 mL / NET: 805 mL        Appearance: NAD obese   HEENT:  Dry  oral mucosa, PERRL, EOMI	  Lymphatic: No lymphadenopathy  Cardiovascular: irregular  S1 S2, No JVD, No murmurs, No edema  Respiratory: decreased bs 	  Psychiatry: A & O x 3, Mood & affect appropriate  Gastrointestinal:  Soft, Non-tender, + BS	  Skin: No rashes, No ecchymoses, No cyanosis	  Neurologic: Non-focal  Extremities: Normal range of motion, No clubbing, cyanosis or edema  Vascular: Peripheral pulses palpable 2+ bilaterally    TELEMETRY: SR, [id number] 	    ECG:  	NSR, anterior infarct, no acute ischemic stt changes   RADIOLOGY:  < from: Xray Chest 1 View- PORTABLE-Urgent (03.27.20 @ 10:03) >    EXAM:  XR CHEST PORTABLE URGENT 1V                            PROCEDURE DATE:  03/27/2020            INTERPRETATION:  HISTORY: Cough.     TECHNIQUE: A single AP view of the chest was obtained.    COMPARISON: None.    FINDINGS:  The cardiac silhouette is normal in size. There are no focal consolidations or pleural effusions. The hilar and mediastinal structures appear unremarkable. The osseous structures are intact.    IMPRESSION: Clear lungs.                    ROLANDO MENA M.D., ATTENDING RADIOLOGIST  This document has been electronically signed. Mar 27 2020 10:08AM                < end of copied text >    OTHER: 	  	  LABS:	 	    CARDIAC MARKERS:          Troponin T, High Sensitivity (03.27.20 @ 12:16)    Troponin T, High Sensitivity Result: 16: Rapid upward or downward changes in high-sensitivity troponin levels  suggest acute myocardial injury. Renal impairment may cause sustained  troponin elevations.  Normal: <6 - 14 ng/L  Indeterminate: 15-51 ng/L  Elevated: > 51 ng/L  See http://labs/test/[email or web address] on the [facility] intranet for more  information ng/L                            11.9   4.23  )-----------( 130      ( 28 Mar 2020 06:57 )             37.5     03-28    139  |  107  |  30<H>  ----------------------------<  99  4.2   |  18<L>  |  1.93<H>    Ca    8.6      28 Mar 2020 07:04  Mg     1.7     03-28    TPro  6.2  /  Alb  3.2<L>  /  TBili  0.2  /  DBili  x   /  AST  30  /  ALT  25  /  AlkPhos  58  03-28    proBNP:   Lipid Profile:   HgA1c:   TSH:     COVID-19 PCR . (03.27.20 @ 09:20)    COVID-19 PCR: Detected: All “detected” results on this new test are considered presumptively  positive results, are clinically actionable, and specimens will be  forwarded to CDC for confirmation testing.  Another report (corrected report) will only be issued if discordant  results occur.   All “detected” results on this new test are considered presumptively  positive results, are clinically actionable, and specimens will be  forwarded to CDC for confirmation testing.  Another report (corrected report) will only be issued if discordant  results occur.  This test has been validated by [employer] to be accurate;  though it has not been FDA cleared/approved by the usual pathway.  As with all laboratory tests, results should be correlated with clinical  findings.

## 2020-03-28 NOTE — PROGRESS NOTE ADULT - ASSESSMENT
64M with h/o HTN, HLD with c/o cough, fever , intermittent non bloody diarrhea and syncope in the setting of COVID 19 infection.    Syncope,   - in the setting of COVID 19 infection  - his last nuclear stress test was in Dec 2019 and was normal per pt.  - c/w telemetry monitoring  - check EKG.      Viral infection.    - COVID 19 PCR positive  - presenting with cough, fever, diarrhea, syncopal episode  - CXR w/clear lungs  - saturating 97% on room air  -  procalcitonin/crp/ferritin elevated  - monitor O2 sats  - supportive care ; guaifenesin, tylenol prn.     New Onset afib  - motoprolol 25 bid  - lovenox 100 bid  - 2 d echo  - cardio consult     Essential hypertension.     - hold Losartan in light of NICOLE  - monitor BP.     Dyslipidemia.     - c/w statin.     CKD (chronic kidney disease), stage III.   - pt reports renal impairment x ~6 months  - monitor BMP.    Prophylactic measure.  - lovenox for dvt ppx.

## 2020-03-29 LAB
ALBUMIN SERPL ELPH-MCNC: 3.1 G/DL — LOW (ref 3.3–5)
ALP SERPL-CCNC: 56 U/L — SIGNIFICANT CHANGE UP (ref 40–120)
ALT FLD-CCNC: 26 U/L — SIGNIFICANT CHANGE UP (ref 10–45)
ANION GAP SERPL CALC-SCNC: 16 MMOL/L — SIGNIFICANT CHANGE UP (ref 5–17)
AST SERPL-CCNC: 31 U/L — SIGNIFICANT CHANGE UP (ref 10–40)
BILIRUB SERPL-MCNC: 0.2 MG/DL — SIGNIFICANT CHANGE UP (ref 0.2–1.2)
BUN SERPL-MCNC: 31 MG/DL — HIGH (ref 7–23)
CALCIUM SERPL-MCNC: 8.5 MG/DL — SIGNIFICANT CHANGE UP (ref 8.4–10.5)
CHLORIDE SERPL-SCNC: 106 MMOL/L — SIGNIFICANT CHANGE UP (ref 96–108)
CO2 SERPL-SCNC: 17 MMOL/L — LOW (ref 22–31)
CREAT SERPL-MCNC: 1.93 MG/DL — HIGH (ref 0.5–1.3)
GLUCOSE SERPL-MCNC: 98 MG/DL — SIGNIFICANT CHANGE UP (ref 70–99)
HCT VFR BLD CALC: 37.8 % — LOW (ref 39–50)
HGB BLD-MCNC: 12.4 G/DL — LOW (ref 13–17)
MAGNESIUM SERPL-MCNC: 1.8 MG/DL — SIGNIFICANT CHANGE UP (ref 1.6–2.6)
MCHC RBC-ENTMCNC: 29.1 PG — SIGNIFICANT CHANGE UP (ref 27–34)
MCHC RBC-ENTMCNC: 32.8 GM/DL — SIGNIFICANT CHANGE UP (ref 32–36)
MCV RBC AUTO: 88.7 FL — SIGNIFICANT CHANGE UP (ref 80–100)
NRBC # BLD: 0 /100 WBCS — SIGNIFICANT CHANGE UP (ref 0–0)
PHOSPHATE SERPL-MCNC: 3.3 MG/DL — SIGNIFICANT CHANGE UP (ref 2.5–4.5)
PLATELET # BLD AUTO: 126 K/UL — LOW (ref 150–400)
POTASSIUM SERPL-MCNC: 4 MMOL/L — SIGNIFICANT CHANGE UP (ref 3.5–5.3)
POTASSIUM SERPL-SCNC: 4 MMOL/L — SIGNIFICANT CHANGE UP (ref 3.5–5.3)
PROT SERPL-MCNC: 6 G/DL — SIGNIFICANT CHANGE UP (ref 6–8.3)
RBC # BLD: 4.26 M/UL — SIGNIFICANT CHANGE UP (ref 4.2–5.8)
RBC # FLD: 14.3 % — SIGNIFICANT CHANGE UP (ref 10.3–14.5)
SODIUM SERPL-SCNC: 139 MMOL/L — SIGNIFICANT CHANGE UP (ref 135–145)
T3 SERPL-MCNC: 91 NG/DL — SIGNIFICANT CHANGE UP (ref 80–200)
T4 AB SER-ACNC: 5.6 UG/DL — SIGNIFICANT CHANGE UP (ref 4.6–12)
TSH SERPL-MCNC: 2.01 UIU/ML — SIGNIFICANT CHANGE UP (ref 0.27–4.2)
WBC # BLD: 5.02 K/UL — SIGNIFICANT CHANGE UP (ref 3.8–10.5)
WBC # FLD AUTO: 5.02 K/UL — SIGNIFICANT CHANGE UP (ref 3.8–10.5)

## 2020-03-29 RX ORDER — LOPERAMIDE HCL 2 MG
2 TABLET ORAL THREE TIMES A DAY
Refills: 0 | Status: DISCONTINUED | OUTPATIENT
Start: 2020-03-29 | End: 2020-03-31

## 2020-03-29 RX ADMIN — ATORVASTATIN CALCIUM 40 MILLIGRAM(S): 80 TABLET, FILM COATED ORAL at 21:50

## 2020-03-29 RX ADMIN — Medication 2 MILLIGRAM(S): at 17:19

## 2020-03-29 RX ADMIN — Medication 650 MILLIGRAM(S): at 12:59

## 2020-03-29 RX ADMIN — Medication 50 MILLIGRAM(S): at 17:20

## 2020-03-29 RX ADMIN — APIXABAN 5 MILLIGRAM(S): 2.5 TABLET, FILM COATED ORAL at 05:16

## 2020-03-29 RX ADMIN — Medication 2 MILLIGRAM(S): at 21:50

## 2020-03-29 RX ADMIN — APIXABAN 5 MILLIGRAM(S): 2.5 TABLET, FILM COATED ORAL at 17:20

## 2020-03-29 RX ADMIN — Medication 650 MILLIGRAM(S): at 14:00

## 2020-03-29 RX ADMIN — Medication 650 MILLIGRAM(S): at 18:29

## 2020-03-29 RX ADMIN — Medication 650 MILLIGRAM(S): at 22:54

## 2020-03-29 RX ADMIN — Medication 50 MILLIGRAM(S): at 05:16

## 2020-03-29 NOTE — PROGRESS NOTE ADULT - PROBLEM SELECTOR PLAN 1
Now in Sinus   Cont with Metoprolol 50 bid for now   Eliquis 5 bid  TTE   outpatient Sleep study to rule out JILL

## 2020-03-29 NOTE — PROGRESS NOTE ADULT - ASSESSMENT
64M with h/o HTN, HLD with c/o cough, fever , intermittent non bloody diarrhea and syncope in the setting of COVID 19 infection.    Syncope,   - in the setting of COVID 19 infection  - his last nuclear stress test was in Dec 2019 and was normal per pt.  - c/w telemetry monitoring  - check EKG.      Viral infection.    - COVID 19 PCR positive  - presenting with cough, fever, diarrhea, syncopal episode  - CXR w/clear lungs  - saturating 97% on room air  -  procalcitonin/crp/ferritin elevated  - monitor O2 sats  - supportive care ; guaifenesin, tylenol prn.     diarrhea likely 2/2 covid  - imodium tid x 2 days    New Onset Paroxysmal atrial fibrillation.    - Now in Sinus   - Cont with Metoprolol 50 bid for now   - Eliquis 5 bid  - TTE   - outpatient Sleep study to rule out JILL.   - cardio consult     Essential hypertension.     - hold Losartan in light of NICOLE  - monitor BP.     Dyslipidemia.     - c/w statin.     CKD (chronic kidney disease), stage III.   - pt reports renal impairment x ~6 months  - monitor BMP.    Prophylactic measure.  - lovenox for dvt ppx.

## 2020-03-30 RX ADMIN — Medication 50 MILLIGRAM(S): at 04:55

## 2020-03-30 RX ADMIN — Medication 2 MILLIGRAM(S): at 13:52

## 2020-03-30 RX ADMIN — APIXABAN 5 MILLIGRAM(S): 2.5 TABLET, FILM COATED ORAL at 04:51

## 2020-03-30 RX ADMIN — Medication 650 MILLIGRAM(S): at 21:43

## 2020-03-30 RX ADMIN — ATORVASTATIN CALCIUM 40 MILLIGRAM(S): 80 TABLET, FILM COATED ORAL at 19:33

## 2020-03-30 RX ADMIN — Medication 50 MILLIGRAM(S): at 17:55

## 2020-03-30 RX ADMIN — Medication 650 MILLIGRAM(S): at 00:00

## 2020-03-30 RX ADMIN — Medication 2 MILLIGRAM(S): at 19:33

## 2020-03-30 RX ADMIN — APIXABAN 5 MILLIGRAM(S): 2.5 TABLET, FILM COATED ORAL at 17:55

## 2020-03-30 RX ADMIN — Medication 650 MILLIGRAM(S): at 11:21

## 2020-03-30 RX ADMIN — Medication 2 MILLIGRAM(S): at 04:52

## 2020-03-30 NOTE — PROGRESS NOTE ADULT - ASSESSMENT
64M with h/o HTN, HLD with c/o cough, fever , intermittent non bloody diarrhea and syncope in the setting of COVID 19 infection.    Syncope,   - in the setting of COVID 19 infection  - his last nuclear stress test was in Dec 2019 and was normal per pt.  - c/w telemetry monitoring  - check EKG.      Viral infection.    - COVID 19 PCR positive  - presenting with cough, fever, diarrhea, syncopal episode  - CXR w/clear lungs  - saturating 97% on room air  -  procalcitonin/crp/ferritin elevated  - monitor O2 sats  - supportive care ; guaifenesin, tylenol prn.     diarrhea likely 2/2 covid  - imodium tid x 2 days    New Onset Paroxysmal atrial fibrillation.    - Now in Sinus   - Cont with Metoprolol 50 bid for now   - Eliquis 5 bid  - TTE   - outpatient Sleep study to rule out JILL.   - cardio consult apreciated  [id number]     Essential hypertension.     - hold Losartan in light of NICOLE  - monitor BP.     Dyslipidemia.     - c/w statin.     CKD (chronic kidney disease), stage III.   - pt reports renal impairment x ~6 months  - monitor BMP.    Prophylactic measure.  - lovenox for dvt ppx.

## 2020-03-31 RX ORDER — LOPERAMIDE HCL 2 MG
2 TABLET ORAL THREE TIMES A DAY
Refills: 0 | Status: DISCONTINUED | OUTPATIENT
Start: 2020-03-31 | End: 2020-04-02

## 2020-03-31 RX ADMIN — Medication 50 MILLIGRAM(S): at 05:02

## 2020-03-31 RX ADMIN — ATORVASTATIN CALCIUM 40 MILLIGRAM(S): 80 TABLET, FILM COATED ORAL at 21:24

## 2020-03-31 RX ADMIN — APIXABAN 5 MILLIGRAM(S): 2.5 TABLET, FILM COATED ORAL at 05:02

## 2020-03-31 RX ADMIN — Medication 50 MILLIGRAM(S): at 17:49

## 2020-03-31 RX ADMIN — APIXABAN 5 MILLIGRAM(S): 2.5 TABLET, FILM COATED ORAL at 17:49

## 2020-03-31 RX ADMIN — Medication 2 MILLIGRAM(S): at 05:02

## 2020-03-31 NOTE — PROGRESS NOTE ADULT - ASSESSMENT
64M with h/o HTN, HLD with c/o cough, fever , intermittent non bloody diarrhea and syncope in the setting of COVID 19 infection.    Syncope,   - in the setting of COVID 19 infection  - his last nuclear stress test was in Dec 2019 and was normal per pt.  - c/w telemetry monitoring  - check EKG.      Viral infection.    - COVID 19 PCR positive  - presenting with cough, fever, diarrhea, syncopal episode  - CXR w/clear lungs  - saturating 97% on room air  -  procalcitonin/crp/ferritin elevated  - monitor O2 sats  - supportive care ; guaifenesin, tylenol prn.     diarrhea likely 2/2 covid  - imodium tid     New Onset Paroxysmal atrial fibrillation.    - Now in Sinus   - Cont with Metoprolol 50 bid for now   - Eliquis 5 bid  - TTE   - outpatient Sleep study to rule out JILL.   - cardio consult apreciated  [id number]     Essential hypertension.     - hold Losartan in light of NICOLE  - monitor BP.     Dyslipidemia.     - c/w statin.     CKD (chronic kidney disease), stage III.   - pt reports renal impairment x ~6 months  - monitor BMP.    Prophylactic measure.  - lovenox for dvt ppx.

## 2020-04-01 ENCOUNTER — TRANSCRIPTION ENCOUNTER (OUTPATIENT)
Age: 65
End: 2020-04-01

## 2020-04-01 LAB
ALBUMIN SERPL ELPH-MCNC: 2.4 G/DL — LOW (ref 3.3–5)
ALP SERPL-CCNC: 49 U/L — SIGNIFICANT CHANGE UP (ref 40–120)
ALT FLD-CCNC: 41 U/L — SIGNIFICANT CHANGE UP (ref 10–45)
ANION GAP SERPL CALC-SCNC: 15 MMOL/L — SIGNIFICANT CHANGE UP (ref 5–17)
AST SERPL-CCNC: 50 U/L — HIGH (ref 10–40)
BILIRUB SERPL-MCNC: 0.2 MG/DL — SIGNIFICANT CHANGE UP (ref 0.2–1.2)
BUN SERPL-MCNC: 51 MG/DL — HIGH (ref 7–23)
CALCIUM SERPL-MCNC: 8.4 MG/DL — SIGNIFICANT CHANGE UP (ref 8.4–10.5)
CHLORIDE SERPL-SCNC: 103 MMOL/L — SIGNIFICANT CHANGE UP (ref 96–108)
CO2 SERPL-SCNC: 17 MMOL/L — LOW (ref 22–31)
CREAT SERPL-MCNC: 1.98 MG/DL — HIGH (ref 0.5–1.3)
CULTURE RESULTS: SIGNIFICANT CHANGE UP
CULTURE RESULTS: SIGNIFICANT CHANGE UP
FERRITIN SERPL-MCNC: 751 NG/ML — HIGH (ref 30–400)
GLUCOSE SERPL-MCNC: 88 MG/DL — SIGNIFICANT CHANGE UP (ref 70–99)
HCT VFR BLD CALC: 33.3 % — LOW (ref 39–50)
HCT VFR BLD CALC: 34.7 % — LOW (ref 39–50)
HGB BLD-MCNC: 11.1 G/DL — LOW (ref 13–17)
HGB BLD-MCNC: 11.1 G/DL — LOW (ref 13–17)
MAGNESIUM SERPL-MCNC: 2.2 MG/DL — SIGNIFICANT CHANGE UP (ref 1.6–2.6)
MCHC RBC-ENTMCNC: 28.2 PG — SIGNIFICANT CHANGE UP (ref 27–34)
MCHC RBC-ENTMCNC: 29.4 PG — SIGNIFICANT CHANGE UP (ref 27–34)
MCHC RBC-ENTMCNC: 32 GM/DL — SIGNIFICANT CHANGE UP (ref 32–36)
MCHC RBC-ENTMCNC: 33.3 GM/DL — SIGNIFICANT CHANGE UP (ref 32–36)
MCV RBC AUTO: 88.1 FL — SIGNIFICANT CHANGE UP (ref 80–100)
MCV RBC AUTO: 88.3 FL — SIGNIFICANT CHANGE UP (ref 80–100)
NRBC # BLD: 0 /100 WBCS — SIGNIFICANT CHANGE UP (ref 0–0)
NRBC # BLD: 0 /100 WBCS — SIGNIFICANT CHANGE UP (ref 0–0)
PHOSPHATE SERPL-MCNC: 4.1 MG/DL — SIGNIFICANT CHANGE UP (ref 2.5–4.5)
PLATELET # BLD AUTO: 138 K/UL — LOW (ref 150–400)
PLATELET # BLD AUTO: 147 K/UL — LOW (ref 150–400)
POTASSIUM SERPL-MCNC: 4 MMOL/L — SIGNIFICANT CHANGE UP (ref 3.5–5.3)
POTASSIUM SERPL-SCNC: 4 MMOL/L — SIGNIFICANT CHANGE UP (ref 3.5–5.3)
PROT SERPL-MCNC: 6.1 G/DL — SIGNIFICANT CHANGE UP (ref 6–8.3)
RBC # BLD: 3.78 M/UL — LOW (ref 4.2–5.8)
RBC # BLD: 3.93 M/UL — LOW (ref 4.2–5.8)
RBC # FLD: 14.2 % — SIGNIFICANT CHANGE UP (ref 10.3–14.5)
RBC # FLD: 14.3 % — SIGNIFICANT CHANGE UP (ref 10.3–14.5)
SODIUM SERPL-SCNC: 135 MMOL/L — SIGNIFICANT CHANGE UP (ref 135–145)
SPECIMEN SOURCE: SIGNIFICANT CHANGE UP
SPECIMEN SOURCE: SIGNIFICANT CHANGE UP
WBC # BLD: 5.97 K/UL — SIGNIFICANT CHANGE UP (ref 3.8–10.5)
WBC # BLD: 6.19 K/UL — SIGNIFICANT CHANGE UP (ref 3.8–10.5)
WBC # FLD AUTO: 5.97 K/UL — SIGNIFICANT CHANGE UP (ref 3.8–10.5)
WBC # FLD AUTO: 6.19 K/UL — SIGNIFICANT CHANGE UP (ref 3.8–10.5)

## 2020-04-01 RX ADMIN — Medication 50 MILLIGRAM(S): at 17:38

## 2020-04-01 RX ADMIN — ATORVASTATIN CALCIUM 40 MILLIGRAM(S): 80 TABLET, FILM COATED ORAL at 21:46

## 2020-04-01 RX ADMIN — APIXABAN 5 MILLIGRAM(S): 2.5 TABLET, FILM COATED ORAL at 17:38

## 2020-04-01 RX ADMIN — APIXABAN 5 MILLIGRAM(S): 2.5 TABLET, FILM COATED ORAL at 04:49

## 2020-04-01 RX ADMIN — Medication 50 MILLIGRAM(S): at 04:49

## 2020-04-01 NOTE — DIETITIAN INITIAL EVALUATION ADULT. - REASON INDICATOR FOR ASSESSMENT
Initial RD assessment. Unable to conduct face-to-face interview secondary to restricted isolation precautions with pt medical condition. Nutrition Focused Physical Exam deferred as well at this time. RD interview conducted via phone at this time.  Information obtained from: pt, electronic medical record

## 2020-04-01 NOTE — DISCHARGE NOTE PROVIDER - NSDCCPCAREPLAN_GEN_ALL_CORE_FT
PRINCIPAL DISCHARGE DIAGNOSIS  Diagnosis: Viral infection  Assessment and Plan of Treatment: Viral infection- Covid 19.      SECONDARY DISCHARGE DIAGNOSES  Diagnosis: Paroxysmal atrial fibrillation  Assessment and Plan of Treatment: Paroxysmal atrial fibrillation    Diagnosis: Syncope  Assessment and Plan of Treatment: Syncope    Diagnosis: CKD (chronic kidney disease), stage III  Assessment and Plan of Treatment: CKD (chronic kidney disease), stage III    Diagnosis: Essential hypertension  Assessment and Plan of Treatment: Essential hypertension PRINCIPAL DISCHARGE DIAGNOSIS  Diagnosis: Viral infection  Assessment and Plan of Treatment: Viral infection- You tested positive for COVID 19.  You no longer require hospitalization.  Please restrict activities outside of your home except for getting medical care.  Do not go to work, school, or public areas.  Avoid using public transportation, ride-sharing, or taxis.  Separate yourself from other people and animals in your home.  Call ahead before visiting your doctor.  Wear a facemask when you are around other people. Cover your cough and sneezes.  Clean your hands often.  Avoid sharing personal household items.  Clean all frequently touched surfaces daily. Take Tylenol as needed every 6 hours if you develop a fever. Do not exceed  4g in a 24 hour period.      SECONDARY DISCHARGE DIAGNOSES  Diagnosis: Paroxysmal atrial fibrillation  Assessment and Plan of Treatment: Paroxysmal atrial fibrillation    Diagnosis: Syncope  Assessment and Plan of Treatment: Syncope    Diagnosis: CKD (chronic kidney disease), stage III  Assessment and Plan of Treatment: CKD (chronic kidney disease), stage III    Diagnosis: Essential hypertension  Assessment and Plan of Treatment: Essential hypertension PRINCIPAL DISCHARGE DIAGNOSIS  Diagnosis: Viral infection  Assessment and Plan of Treatment: Viral infection- You tested positive for COVID 19.  You no longer require hospitalization.  Please restrict activities outside of your home except for getting medical care.  Do not go to work, school, or public areas.  Avoid using public transportation, ride-sharing, or taxis.  Separate yourself from other people and animals in your home.  Call ahead before visiting your doctor.  Wear a facemask when you are around other people. Cover your cough and sneezes.  Clean your hands often.  Avoid sharing personal household items.  Clean all frequently touched surfaces daily. Take Tylenol as needed every 6 hours if you develop a fever. Do not exceed  4g in a 24 hour period.      SECONDARY DISCHARGE DIAGNOSES  Diagnosis: Paroxysmal atrial fibrillation  Assessment and Plan of Treatment: You were started on Eliquis 5 mg. Take one tablet every 12 hours. You were also started on Metoprolol 50 mg one tablet two times a day    Diagnosis: Syncope  Assessment and Plan of Treatment: Syncope    Diagnosis: CKD (chronic kidney disease), stage III  Assessment and Plan of Treatment: CKD (chronic kidney disease), stage III    Diagnosis: Essential hypertension  Assessment and Plan of Treatment: Essential hypertension

## 2020-04-01 NOTE — DISCHARGE NOTE PROVIDER - NSDCMRMEDTOKEN_GEN_ALL_CORE_FT
atorvastatin 40 mg oral tablet: 1 tab(s) orally once a day  ezetimibe 10 mg oral tablet: 1 tab(s) orally once a day  losartan 100 mg oral tablet: 1 tab(s) orally once a day acetaminophen 325 mg oral tablet: 2 tab(s) orally every 6 hours, As needed, Temp greater or equal to 38C (100.4F), Mild Pain (1 - 3), Moderate Pain (4 - 6)  atorvastatin 40 mg oral tablet: 1 tab(s) orally once a day  ezetimibe 10 mg oral tablet: 1 tab(s) orally once a day  losartan 100 mg oral tablet: 1 tab(s) orally once a day acetaminophen 325 mg oral tablet: 2 tab(s) orally every 6 hours, As needed, Temp greater or equal to 38C (100.4F), Mild Pain (1 - 3), Moderate Pain (4 - 6)  apixaban 5 mg oral tablet: 1 tab(s) orally every 12 hours   atorvastatin 40 mg oral tablet: 1 tab(s) orally once a day  ezetimibe 10 mg oral tablet: 1 tab(s) orally once a day  losartan 100 mg oral tablet: 1 tab(s) orally once a day  metoprolol tartrate 50 mg oral tablet: 1 tab(s) orally 2 times a day

## 2020-04-01 NOTE — DISCHARGE NOTE PROVIDER - NSDCFUADDAPPT_GEN_ALL_CORE_FT
Please schedule a follow up appointment with your primary care provider in 1-2 weeks. Call ahead of time to discuss your recent hospitalization. Please schedule a follow up appointment with your primary care provider in 1-2 weeks. Call ahead of time to discuss your recent hospitalization and your new development of Atrial Fibrillation.    Please schedule an outpatient visit with ophthalmology 1-2 weeks after discharge regarding your complaint about floaters. Clinic at [address] [place] [phone], phone     Please schedule an appointment with a sleep clinic to evaluate for obstructive sleep apnea, as it was recommended by our in house cardiologist.

## 2020-04-01 NOTE — DISCHARGE NOTE PROVIDER - HOSPITAL COURSE
64M with h/o HTN, HLD with c/o cough, fever, intermittent non bloody diarrhea x 5 days. Of note he had 1 episode of syncope while at home 2 days ago and then a brief episode of presyncopal symptoms yesterday. Pt reports that he has lost his sense of smell and taste since symptoms began. Pt denies hitting his head during syncopal episode. He did lose control of his bowels. Pt denies any recent travel but reports one sick contact: his daughter. His wife also has similar symptoms. His last nuclear stress test was in Dec 2019 and was normal per pt. On admission he c/o mild sob, cough and headache. Pt tested positive for COVID-19, CXR clear. Pt started on guaifenesin, and tylenol prn. Pt had a new onset afib on EKG, started on Eliquis and Metoprolol. Pt's diarrhea controlled with Imodium. Pt SpO2 was 97% on about 2 L NC on and off, current SpO2 on room air is 97%. On admission patient was hemodynamically stable, tolerating diet, voiding, bowel movements, ambulating and comfortable with discharge plan.         Cariology recommends outpatient sleep study to rule out JILL. 64M with h/o HTN, HLD with c/o cough, fever, intermittent non bloody diarrhea x 5 days. Of note he had 1 episode of syncope while at home 2 days ago and then a brief episode of presyncopal symptoms yesterday. Pt reports that he has lost his sense of smell and taste since symptoms began. Pt denies hitting his head during syncopal episode. He did lose control of his bowels. Pt denies any recent travel but reports one sick contact: his daughter. His wife also has similar symptoms. His last nuclear stress test was in Dec 2019 and was normal per pt. On admission he c/o mild sob, cough and headache. Pt tested positive for COVID-19, CXR clear. Pt started on guaifenesin, and tylenol prn. Pt had a new onset afib on EKG, started on Eliquis and Metoprolol. Pt's diarrhea controlled with Imodium. Pt SpO2 was 97% on about 2 L NC on and off, current SpO2 on room air is 96%. Pt complained of seeing floaters, ophthalmology was consulted and spoke to patient via telemedicine. Pt stated the floaters resolved on day of admission, but was advised to follow up outpatient. On admission patient was hemodynamically stable, tolerating diet, voiding, bowel movements, ambulating and comfortable with discharge plan.         Cariology recommends outpatient sleep study to rule out JILL.

## 2020-04-01 NOTE — DIETITIAN INITIAL EVALUATION ADULT. - PERTINENT MEDS FT
MEDICATIONS  (STANDING):  apixaban 5 milliGRAM(s) Oral every 12 hours  atorvastatin 40 milliGRAM(s) Oral at bedtime  metoprolol tartrate 50 milliGRAM(s) Oral two times a day    MEDICATIONS  (PRN):  acetaminophen   Tablet .. 650 milliGRAM(s) Oral every 6 hours PRN Temp greater or equal to 38C (100.4F), Mild Pain (1 - 3), Moderate Pain (4 - 6)  guaiFENesin   Syrup  (Sugar-Free) 200 milliGRAM(s) Oral every 6 hours PRN Cough  loperamide 2 milliGRAM(s) Oral three times a day PRN Diarrhea

## 2020-04-01 NOTE — PROGRESS NOTE ADULT - ASSESSMENT
64M with h/o HTN, HLD with c/o cough, fever , intermittent non bloody diarrhea and syncope in the setting of COVID 19 infection.    Syncope,   - in the setting of COVID 19 infection  - his last nuclear stress test was in Dec 2019 and was normal per pt.  - c/w telemetry monitoring  - check EKG.      Viral infection.    - COVID 19 PCR positive  - presenting with cough, fever, diarrhea, syncopal episode  - CXR w/clear lungs  - saturating 97% on room air  -  procalcitonin/crp/ferritin elevated  - monitor O2 sats  - supportive care ; guaifenesin, tylenol prn.     diarrhea likely 2/2 covid  - imodium tid     New Onset Paroxysmal atrial fibrillation.    - Now in Sinus   - Cont with Metoprolol 50 bid for now   - Eliquis 5 bid  - TTE   - outpatient Sleep study to rule out JILL.   - cardio consult apreciated  [id number]     Essential hypertension.     - hold Losartan in light of NICOLE  - monitor BP.     Dyslipidemia.     - c/w statin.     CKD (chronic kidney disease), stage III.   - pt reports renal impairment x ~6 months  - monitor BMP.    Prophylactic measure.  - lovenox for dvt ppx.    d/c planning

## 2020-04-01 NOTE — DIETITIAN INITIAL EVALUATION ADULT. - PHYSICAL APPEARANCE
other (specify) Height: 70 inches, Weight: 240 pounds (dosing)  BMI: 34.4 kg/m2 IBW: 166 pounds (+/-10%), %IBW: 145%  Pertinent Info: 1+ edema to L Leg noted, no pressure injuries noted at this time in nursing flow sheet.

## 2020-04-01 NOTE — DIETITIAN INITIAL EVALUATION ADULT. - ADD RECOMMEND
1. Continue current DASH/TLC therapeutic diet as indicated, monitor for need for additional restrictions. 2. Provide 3 Mighty Health Shakes/daily (to provide additional 516 kcal, 15 g protein) to promote adequate po intake 3. Will continue to monitor nutrient intake, wt, labs, f/u per protocol

## 2020-04-01 NOTE — DISCHARGE NOTE PROVIDER - NSDCACTIVITY_GEN_ALL_CORE
Bathing allowed/No heavy lifting/straining/Do not drive or operate machinery/Showering allowed/Do not make important decisions/Walking - Indoors allowed

## 2020-04-01 NOTE — DIETITIAN INITIAL EVALUATION ADULT. - OTHER INFO
Per chart, pt is "64M with h/o HTN, HLD with c/o cough, fever , intermittent non bloody diarrhea and syncope in the setting of COVID 19 infection."    Pt reports no therapeutic diet followed PTA. NKFA reported.  No micronutrient supplementation PTA.  Endorses a UBW of ~265 lbs and endorses wt loss PTA secondary to not eating well for ~5 days PTA. Dosing wt noted as 240 lbs, suspect pt did not lose 25 lbs in 5 days. Dosing wt is noted as a stated wt, would recommend obtaining new bed or standing wt to quantify pt reported wt loss.    No chewing/swallowing difficulties reported.  No c/o nausea, vomiting, diarrhea, or constipation. Reports no longer with diarrhea, although has not had BM in several days.   Pt reports fair appetite and po intake, noted with % po intake of meals per nursing flow sheet.    Pt amenable to addition of oral nutrition supplement to assist with adequate po intake in-patient. Pt reports therapeutic diet is not contributing factor to decreased po intake. Nutrition education not indicated at this time in setting of viral symptoms and decreased po intake. Pt made aware RD remains available.

## 2020-04-01 NOTE — DISCHARGE NOTE PROVIDER - CARE PROVIDER_API CALL
Cailin Roberts)  Medicine  [address] [place] [address], [address]  [place] [address]  Phone: [phone]  Follow Up Time: 2 weeks Cailin Roberts)  Medicine  [address] [place] [address], [address]  [place] [address]  Phone: [phone]  Follow Up Time: 2 weeks

## 2020-04-01 NOTE — DISCHARGE NOTE PROVIDER - NSDCFUADDINST_GEN_ALL_CORE_FT
You tested positive for COVID 19.  You no longer require hospitalization.  Please restrict activities outside of your home except for getting medical care.  Do not go to work, school, or public areas.  Avoid using public transportation, ride-sharing, or taxis.  Separate yourself from other people and animals in your home.  Call ahead before visiting your doctor.  Wear a facemask when you are around other people. Cover your cough and sneezes.  Clean your hands often.  Avoid sharing personal household items.  Clean all frequently touched surfaces daily. Take Tylenol as needed every 6 hours if you develop a fever. Do not exceed  4g in a 24 hour period

## 2020-04-02 ENCOUNTER — TRANSCRIPTION ENCOUNTER (OUTPATIENT)
Age: 65
End: 2020-04-02

## 2020-04-02 VITALS
HEART RATE: 65 BPM | TEMPERATURE: 98 F | SYSTOLIC BLOOD PRESSURE: 118 MMHG | RESPIRATION RATE: 18 BRPM | OXYGEN SATURATION: 98 % | DIASTOLIC BLOOD PRESSURE: 75 MMHG

## 2020-04-02 LAB
ALBUMIN SERPL ELPH-MCNC: 2.7 G/DL — LOW (ref 3.3–5)
ALP SERPL-CCNC: 65 U/L — SIGNIFICANT CHANGE UP (ref 40–120)
ALT FLD-CCNC: 108 U/L — HIGH (ref 10–45)
ANION GAP SERPL CALC-SCNC: 15 MMOL/L — SIGNIFICANT CHANGE UP (ref 5–17)
AST SERPL-CCNC: 129 U/L — HIGH (ref 10–40)
BILIRUB DIRECT SERPL-MCNC: <0.1 MG/DL — SIGNIFICANT CHANGE UP (ref 0–0.2)
BILIRUB INDIRECT FLD-MCNC: >0.2 MG/DL — SIGNIFICANT CHANGE UP (ref 0.2–1)
BILIRUB SERPL-MCNC: 0.3 MG/DL — SIGNIFICANT CHANGE UP (ref 0.2–1.2)
BUN SERPL-MCNC: 48 MG/DL — HIGH (ref 7–23)
CALCIUM SERPL-MCNC: 8.8 MG/DL — SIGNIFICANT CHANGE UP (ref 8.4–10.5)
CHLORIDE SERPL-SCNC: 105 MMOL/L — SIGNIFICANT CHANGE UP (ref 96–108)
CO2 SERPL-SCNC: 18 MMOL/L — LOW (ref 22–31)
CREAT SERPL-MCNC: 1.78 MG/DL — HIGH (ref 0.5–1.3)
CRP SERPL-MCNC: 5.31 MG/DL — HIGH (ref 0–0.4)
GLUCOSE SERPL-MCNC: 90 MG/DL — SIGNIFICANT CHANGE UP (ref 70–99)
HCT VFR BLD CALC: 35.8 % — LOW (ref 39–50)
HGB BLD-MCNC: 11.4 G/DL — LOW (ref 13–17)
MCHC RBC-ENTMCNC: 28.2 PG — SIGNIFICANT CHANGE UP (ref 27–34)
MCHC RBC-ENTMCNC: 31.8 GM/DL — LOW (ref 32–36)
MCV RBC AUTO: 88.6 FL — SIGNIFICANT CHANGE UP (ref 80–100)
NRBC # BLD: 0 /100 WBCS — SIGNIFICANT CHANGE UP (ref 0–0)
PLATELET # BLD AUTO: 157 K/UL — SIGNIFICANT CHANGE UP (ref 150–400)
POTASSIUM SERPL-MCNC: 4 MMOL/L — SIGNIFICANT CHANGE UP (ref 3.5–5.3)
POTASSIUM SERPL-SCNC: 4 MMOL/L — SIGNIFICANT CHANGE UP (ref 3.5–5.3)
PROT SERPL-MCNC: 6.2 G/DL — SIGNIFICANT CHANGE UP (ref 6–8.3)
RBC # BLD: 4.04 M/UL — LOW (ref 4.2–5.8)
RBC # FLD: 14.1 % — SIGNIFICANT CHANGE UP (ref 10.3–14.5)
SODIUM SERPL-SCNC: 138 MMOL/L — SIGNIFICANT CHANGE UP (ref 135–145)
WBC # BLD: 5.99 K/UL — SIGNIFICANT CHANGE UP (ref 3.8–10.5)
WBC # FLD AUTO: 5.99 K/UL — SIGNIFICANT CHANGE UP (ref 3.8–10.5)

## 2020-04-02 PROCEDURE — 86803 HEPATITIS C AB TEST: CPT

## 2020-04-02 PROCEDURE — 87635 SARS-COV-2 COVID-19 AMP PRB: CPT

## 2020-04-02 PROCEDURE — 93005 ELECTROCARDIOGRAM TRACING: CPT

## 2020-04-02 PROCEDURE — 99238 HOSP IP/OBS DSCHRG MGMT 30/<: CPT

## 2020-04-02 PROCEDURE — 80048 BASIC METABOLIC PNL TOTAL CA: CPT

## 2020-04-02 PROCEDURE — 82330 ASSAY OF CALCIUM: CPT

## 2020-04-02 PROCEDURE — 86140 C-REACTIVE PROTEIN: CPT

## 2020-04-02 PROCEDURE — 82803 BLOOD GASES ANY COMBINATION: CPT

## 2020-04-02 PROCEDURE — 84443 ASSAY THYROID STIM HORMONE: CPT

## 2020-04-02 PROCEDURE — 85014 HEMATOCRIT: CPT

## 2020-04-02 PROCEDURE — 83735 ASSAY OF MAGNESIUM: CPT

## 2020-04-02 PROCEDURE — 80076 HEPATIC FUNCTION PANEL: CPT

## 2020-04-02 PROCEDURE — 82728 ASSAY OF FERRITIN: CPT

## 2020-04-02 PROCEDURE — 85027 COMPLETE CBC AUTOMATED: CPT

## 2020-04-02 PROCEDURE — 87040 BLOOD CULTURE FOR BACTERIA: CPT

## 2020-04-02 PROCEDURE — 82435 ASSAY OF BLOOD CHLORIDE: CPT

## 2020-04-02 PROCEDURE — 99285 EMERGENCY DEPT VISIT HI MDM: CPT | Mod: 25

## 2020-04-02 PROCEDURE — 80053 COMPREHEN METABOLIC PANEL: CPT

## 2020-04-02 PROCEDURE — 96360 HYDRATION IV INFUSION INIT: CPT

## 2020-04-02 PROCEDURE — 84145 PROCALCITONIN (PCT): CPT

## 2020-04-02 PROCEDURE — 71045 X-RAY EXAM CHEST 1 VIEW: CPT

## 2020-04-02 PROCEDURE — 83605 ASSAY OF LACTIC ACID: CPT

## 2020-04-02 PROCEDURE — 84132 ASSAY OF SERUM POTASSIUM: CPT

## 2020-04-02 PROCEDURE — 84480 ASSAY TRIIODOTHYRONINE (T3): CPT

## 2020-04-02 PROCEDURE — 84436 ASSAY OF TOTAL THYROXINE: CPT

## 2020-04-02 PROCEDURE — 84484 ASSAY OF TROPONIN QUANT: CPT

## 2020-04-02 PROCEDURE — 84295 ASSAY OF SERUM SODIUM: CPT

## 2020-04-02 PROCEDURE — 82947 ASSAY GLUCOSE BLOOD QUANT: CPT

## 2020-04-02 PROCEDURE — 84100 ASSAY OF PHOSPHORUS: CPT

## 2020-04-02 RX ORDER — ACETAMINOPHEN 500 MG
2 TABLET ORAL
Qty: 0 | Refills: 0 | DISCHARGE
Start: 2020-04-02

## 2020-04-02 RX ORDER — APIXABAN 2.5 MG/1
1 TABLET, FILM COATED ORAL
Qty: 60 | Refills: 0
Start: 2020-04-02 | End: 2020-05-01

## 2020-04-02 RX ORDER — METOPROLOL TARTRATE 50 MG
1 TABLET ORAL
Qty: 60 | Refills: 0
Start: 2020-04-02 | End: 2020-05-01

## 2020-04-02 RX ADMIN — Medication 50 MILLIGRAM(S): at 05:06

## 2020-04-02 RX ADMIN — APIXABAN 5 MILLIGRAM(S): 2.5 TABLET, FILM COATED ORAL at 05:06

## 2020-04-02 RX ADMIN — Medication 50 MILLIGRAM(S): at 17:39

## 2020-04-02 RX ADMIN — Medication 650 MILLIGRAM(S): at 08:44

## 2020-04-02 RX ADMIN — APIXABAN 5 MILLIGRAM(S): 2.5 TABLET, FILM COATED ORAL at 17:39

## 2020-04-02 NOTE — PROGRESS NOTE ADULT - REASON FOR ADMISSION
cough, fever, syncope, sob

## 2020-04-02 NOTE — DISCHARGE NOTE NURSING/CASE MANAGEMENT/SOCIAL WORK - NSDCFUADDAPPT_GEN_ALL_CORE_FT
Please schedule a follow up appointment with your primary care provider in 1-2 weeks. Call ahead of time to discuss your recent hospitalization and your new development of Atrial Fibrillation.    Please schedule an outpatient visit with ophthalmology 1-2 weeks after discharge regarding your complaint about floaters. Clinic at [address] [place] [phone], phone     Please schedule an appointment with a sleep clinic to evaluate for obstructive sleep apnea, as it was recommended by our in house cardiologist.

## 2020-04-02 NOTE — CHART NOTE - NSCHARTNOTEFT_GEN_A_CORE
64M with h/o HTN, HLD with c/o cough, fever , intermittent non bloody diarrhea and syncope found to be COVID-19 positive on 3/27/20. Spoke with patient directly by phone. Complains of new onset floaters (described as grey branches) in vision for 2 days, intermittent, lasting 10-15 seconds at a time and resolve with blinking of the eyes. Thinks floaters are in both eyes and sees them less now than 2 days ago. Currently vision at baseline, no floaters, no curtain of black, no blurred vision. Patient has previous history of RD s/p repair (says symptoms different), CEIOL OU, dry eyes OU.    Discussed with patient that he can be seen outpatient. Patient agrees. Will call patient at  to make sure no worsening of symptoms and can be seen by his retina specialist in 1-2 weeks when out of COVID-19 isolation window (patient forgot name and number of his retina specialist but has it at home) or at our clinic at [address] [facility] [address], [place] [address], phone .    D/w Dr. Rivers (attending).  D/w patient and team.

## 2020-04-02 NOTE — DISCHARGE NOTE NURSING/CASE MANAGEMENT/SOCIAL WORK - PATIENT PORTAL LINK FT
You can access the FollowMyHealth Patient Portal offered by [facility] by registering at the following website: [email or web address]. By joining [facility]’s FollowMyHealth portal, you will also be able to view your health information using other applications (apps) compatible with our system.

## 2020-04-02 NOTE — PROGRESS NOTE ADULT - ASSESSMENT
64M with h/o HTN, HLD with c/o cough, fever , intermittent non bloody diarrhea and syncope in the setting of COVID 19 infection.    Syncope,   - in the setting of COVID 19 infection  - his last nuclear stress test was in Dec 2019 and was normal per pt.  - c/w telemetry monitoring  - check EKG.      Viral infection.    - COVID 19 PCR positive  - presenting with cough, fever, diarrhea, syncopal episode  - CXR w/clear lungs  - saturating 97% on room air  -  procalcitonin/crp/ferritin elevated  - monitor O2 sats  - supportive care ; guaifenesin, tylenol prn.     diarrhea likely 2/2 covid  - imodium tid     New Onset Paroxysmal atrial fibrillation.    - Now in Sinus   - Cont with Metoprolol 50 bid for now   - Eliquis 5 bid  - TTE   - outpatient Sleep study to rule out JILL.   - cardio consult apreciated     Essential hypertension.     - hold Losartan in light of NICOLE  - monitor BP.     Dyslipidemia.     - c/w statin.     CKD (chronic kidney disease), stage III.   - pt reports renal impairment x ~6 months  - monitor BMP.    Prophylactic measure.  - lovenox for dvt ppx.    d/c planning

## 2020-06-12 ENCOUNTER — TRANSCRIPTION ENCOUNTER (OUTPATIENT)
Age: 65
End: 2020-06-12

## 2020-06-25 ENCOUNTER — TRANSCRIPTION ENCOUNTER (OUTPATIENT)
Age: 65
End: 2020-06-25

## 2020-06-27 NOTE — CHART NOTE - NSCHARTNOTEFT_GEN_A_CORE
I, Vanessa Aguirre MD attest that, to the best of my knowledge based on clinical presentation and findings documented in the medical record, this patient had sepsis due to COVID-19 present on admission: Fever (Temp 102 at home, 100.8 in ED), HR 99, RR 20, procalcitonin 0.27, lactate 3.2.

## 2020-08-04 NOTE — ASU PREOP CHECKLIST - HEIGHT IN FEET
Progress Note    Pod 1      S: positive flatus,  positive BM. positive ambulating.  Complain of cramping and pain and abdominal bloating  O:  Temp:  [98 °F (36.7 °C)-98.5 °F (36.9 °C)] 98.1 °F (36.7 °C)  Heart Rate:  [105-125] 115  Resp:  [16-20] 18  BP: (148-198)/() 148/88      Intake/Output Summary (Last 24 hours) at 8/4/2020 1637  Last data filed at 8/4/2020 1351  Gross per 24 hour   Intake 2277 ml   Output 1605 ml   Net 672 ml       Abd:   soft, non distended  Incision: clean, dry  Ostomy pink patent productive with stool at os  Results from last 7 days   Lab Units 08/04/20  0540   WBC 10*3/mm3 10.31   HEMOGLOBIN g/dL 12.8   HEMATOCRIT % 37.6   PLATELETS 10*3/mm3 273     Results from last 7 days   Lab Units 08/04/20  0540   SODIUM mmol/L 132*   POTASSIUM mmol/L 4.1   CHLORIDE mmol/L 102   CO2 mmol/L 23.3   BUN mg/dL 8   CREATININE mg/dL 0.49*   GLUCOSE mg/dL 131*   CALCIUM mg/dL 8.8       Results from last 7 days   Lab Units 08/04/20  0540   MAGNESIUM mg/dL 2.4         A/P: 39 y.o. female with s/p LOW ANTERIOR COLON RESECTION, COLOANAL ANASTOMOSIS, MOBILIZATION SPLENIC FLEXURE, * Panel 2 does not exist *    Continue sips  We will add Robaxin  Encouraged ambulation  Discussed not force-feeding        5

## 2021-02-25 NOTE — ED PROVIDER NOTE - NS ED MD TWO NIGHTS YN
Return call to pt.   Dr. Benson recommends cholesterol panel before her appt next week.   Pt asking if she needs to do a urine.  Pt states she always have urinary problems, is not new for her.   Pt would like to be checked for UTI.  Will place labs for UA and lipid.  Pt understands she will need to fast.  Patient verbalizes understanding and is agreement with treatment plan, no further questions at this time.       Yes

## 2021-12-09 NOTE — ED CLERICAL - BED REQUESTED
27-Mar-2020 12:32 Component      Latest Ref Rng & Units 12/8/2021   SARS CoV 2 Qualitative RT PCR       Detected (A)   Procedural Notes       This result contains rich text formatting which cannot be displayed here. INFLUENZA A ANTIGEN      Not Detected Detected (A)   INFLUENZA B ANTIGEN      Not Detected Not Detected     Attempted to contact pt's mother at phone number confirmed at visit, however has been disconnected. Will attempt to contact pt's mother later.

## 2022-11-28 NOTE — ED ADULT NURSE NOTE - NSSEPSISSUSPECTED_ED_A_ED
History  Chief Complaint   Patient presents with   • Cough     Mother reports pt was diagnosed with flu a here last week, dx with pneumonia at [facility] on 11/23  Mother reports increased cough and decreased appetite  Requesting zofran      1year-old female with mother describes persistent cough after recent hospitalization with influenza a and completing Tamiflu, continues on amoxicillin for infiltrate  She denies any respiratory distress or vomiting  Generally eating and drinking well  No fever  History provided by: Mother  Cough  Timing:  Constant  Progression:  Worsening  Context: upper respiratory infection    Relieved by:  Nothing  Associated symptoms: no fever and no shortness of breath    Behavior:     Behavior:  Normal    Intake amount:  Eating and drinking normally      Prior to Admission Medications   Prescriptions Last Dose Informant Patient Reported? Taking? Pediatric Multiple Vit-C-FA (pediatric multivitamin) chewable tablet   Yes No   Sig: Chew 1 tablet daily   amoxicillin (AMOXIL) 400 MG/5ML suspension   No No   Sig: Take 4 5 mL (360 mg total) by mouth 2 (two) times a day for 7 days   multivitamin (THERAGRAN) TABS   Yes No   Sig: Take 1 tablet by mouth daily      Facility-Administered Medications: None       Past Medical History:   Diagnosis Date   • Blocked tear duct in infant, bilateral    • Elevated bilirubin 2018   • GERD (gastroesophageal reflux disease)    • Known health problems: none        Past Surgical History:   Procedure Laterality Date   • NO PAST SURGERIES         Family History   Problem Relation Age of Onset   • No Known Problems Maternal Grandmother         Copied from mother's family history at birth   • Anemia Mother         Copied from mother's history at birth   • No Known Problems Father      I have reviewed and agree with the history as documented      E-Cigarette/Vaping     E-Cigarette/Vaping Substances     Social History     Tobacco Use   • Smoking status: Never • Smokeless tobacco: Never       Review of Systems   Constitutional: Negative for fever  Respiratory: Positive for cough  Negative for shortness of breath  All other systems reviewed and are negative  Physical Exam  Physical Exam  Vitals and nursing note reviewed  Constitutional:       General: She is not in acute distress  Appearance: She is well-developed  HENT:      Head: Normocephalic and atraumatic  No signs of injury  Right Ear: Tympanic membrane normal       Left Ear: Tympanic membrane normal       Mouth/Throat:      Mouth: Mucous membranes are moist       Pharynx: No posterior oropharyngeal erythema  Eyes:      Conjunctiva/sclera: Conjunctivae normal       Pupils: Pupils are equal, round, and reactive to light  Cardiovascular:      Rate and Rhythm: Normal rate and regular rhythm  Heart sounds: No murmur heard  Pulmonary:      Effort: Pulmonary effort is normal       Breath sounds: Normal breath sounds  Abdominal:      General: Bowel sounds are normal  There is no distension  Palpations: Abdomen is soft  Tenderness: There is no abdominal tenderness  Musculoskeletal:         General: No deformity  Cervical back: Neck supple  Skin:     General: Skin is warm and dry  Neurological:      General: No focal deficit present  Mental Status: She is alert  Cranial Nerves: No cranial nerve deficit  Motor: No weakness        Coordination: Coordination normal          Vital Signs  ED Triage Vitals [11/28/22 1641]   Temperature Pulse Respirations BP SpO2   97 7 °F (36 5 °C) 97 (!) 16 -- 96 %      Temp src Heart Rate Source Patient Position - Orthostatic VS BP Location FiO2 (%)   -- -- Lying Right arm --      Pain Score       No Pain           Vitals:    11/28/22 1641   Pulse: 97   Patient Position - Orthostatic VS: Lying         Visual Acuity      ED Medications  Medications - No data to display    Diagnostic Studies  Results Reviewed     Procedure Component Value Units Date/Time    FLU/RSV/COVID - if FLU/RSV clinically relevant [[id number]]  (Abnormal) Collected: 11/28/22 1705    Lab Status: Final result Specimen: Nares from Nose Updated: 11/28/22 8562     SARS-CoV-2 Negative     INFLUENZA A PCR Positive     INFLUENZA B PCR Negative     RSV PCR Negative    Narrative:      FOR PEDIATRIC PATIENTS - copy/paste COVID Guidelines URL to browser: [email or web address]  [employer]    SARS-CoV-2 assay is a Nucleic Acid Amplification assay intended for the  qualitative detection of nucleic acid from SARS-CoV-2 in nasopharyngeal  swabs  Results are for the presumptive identification of SARS-CoV-2 RNA  Positive results are indicative of infection with SARS-CoV-2, the virus  causing COVID-19, but do not rule out bacterial infection or co-infection  with other viruses  Laboratories within the [place] and its  territories are required to report all positive results to the appropriate  public health authorities  Negative results do not preclude SARS-CoV-2  infection and should not be used as the sole basis for treatment or other  patient management decisions  Negative results must be combined with  clinical observations, patient history, and epidemiological information  This test has not been FDA cleared or approved  This test has been authorized by FDA under an Emergency Use Authorization  (EUA)  This test is only authorized for the duration of time the  declaration that circumstances exist justifying the authorization of the  emergency use of an in vitro diagnostic tests for detection of SARS-CoV-2  virus and/or diagnosis of COVID-19 infection under section 564(b)(1) of  the Act, 21 U  S C  216FFP-2(F)(6), unless the authorization is terminated  or revoked sooner  The test has been validated but independent review by FDA  and CLIA is pending  Test performed using [employer]:  This RT-PCR assay targets N2,  a region unique to SARS-CoV-2  A conserved region in the E-gene was chosen  for pan-Sarbecovirus detection which includes SARS-CoV-2  According to CMS-2020-01-R, this platform meets the definition of high-throughput technology  No orders to display              Procedures  Procedures         ED Course                                             MDM    Disposition  Final diagnoses:   Viral URI with cough - History of influenza     Time reflects when diagnosis was documented in both MDM as applicable and the Disposition within this note     Time User Action Codes Description Comment    11/28/2022  5:02 PM Andree Pozo Add [J06 9] Viral URI with cough     11/28/2022  5:06 PM Andree Pozo Modify [J06 9] Viral URI with cough History of influenza      ED Disposition     ED Disposition   Discharge    Condition   Stable    Date/Time   Mon Nov 28, 2022  5:02 PM    Comment   Sofia Yee discharge to home/self care  Follow-up Information     Follow up With Specialties Details Why Contact Info    Joce Victoria MD Pediatrics Schedule an appointment as soon as possible for a visit in 1 week  [address] [facility]  [phone]            Discharge Medication List as of 11/28/2022  5:06 PM      CONTINUE these medications which have NOT CHANGED    Details   amoxicillin (AMOXIL) 400 MG/5ML suspension Take 4 5 mL (360 mg total) by mouth 2 (two) times a day for 7 days, Starting Thu 11/24/2022, Until Thu 12/1/2022, Normal      multivitamin (THERAGRAN) TABS Take 1 tablet by mouth daily, Historical Med      Pediatric Multiple Vit-C-FA (pediatric multivitamin) chewable tablet Chew 1 tablet daily, Historical Med             No discharge procedures on file      PDMP Review     None          ED Provider  Electronically Signed by           Eduar Hill DO  11/28/22 5352 Yes

## 2023-01-25 NOTE — ED ADULT NURSE NOTE - PSH
(Portions of this note were dictated using voice recognition software and may contain dictation related errors in spelling/grammar/syntax not found on text review)    CC:   Chief Complaint   Patient presents with    Annual Exam       HPI: 74 y.o. female presented for annual examination as a new patient to me, she has no PCP.  Patient has medical history significant for liver cirrhosis, chronic hepatitis-C, hypertension.  She takes carvedilol 3.125 mg b.i.d., lisinopril 20 mg and Aldactone 100 mg daily, reports adherence with medications, blood pressure has been stable.  Patient has liver mass, she was admitted in the hospital in November 2022 when workup for chest pain shows elevated liver enzymes and liver ultrasound showed liver mass which has increased in size from previous scan in 2015, concern for possible neoplasm. Pt follows up with hepatology as an outpatient and had MRI in December and will need biopsy. She is up to date with labs. She is due for dexa scan. She does not smoke, has no toxic habits. She denies having any symptoms or concerns.     Past Medical History:   Diagnosis Date    Cirrhosis     Hepatitis     Hypertension        Past Surgical History:   Procedure Laterality Date    APPENDECTOMY      HYSTERECTOMY         History reviewed. No pertinent family history.    Social History     Tobacco Use    Smoking status: Never   Substance Use Topics    Alcohol use: No    Drug use: No       Lab Results   Component Value Date    WBC 4.06 12/09/2022    HGB 13.8 12/09/2022    HCT 41.6 12/09/2022    MCV 98 12/09/2022     (L) 12/09/2022     (H) 12/09/2022    AST 47 (H) 12/09/2022    BILITOT 1.9 (H) 12/09/2022    ALKPHOS 207 (H) 12/09/2022     12/09/2022    K 4.0 12/09/2022     12/09/2022    CREATININE 1.2 12/09/2022    ESTGFRAFRICA >60.0 03/05/2018    EGFRNONAA 55.5 (A) 03/05/2018    CALCIUM 10.3 12/09/2022    ALBUMIN 4.1 12/09/2022    BUN 14 12/09/2022    CO2 23 12/09/2022    INR 1.1  12/09/2022     (H) 12/09/2022             Vital signs reviewed  PE:   APPEARANCE: In no acute distress.    HEAD: Normocephalic, atraumatic.  EYES: EOMI.  Conjunctivae noninjected.   NECK: Supple with no cervical lymphadenopathy.    CHEST:  Lungs clear to auscultation with no wheezes or crackles.  CARDIOVASCULAR: Normal S1, S2. No rubs, murmurs, or gallops.  ABDOMEN: Bowel sounds normal. Not distended. Soft. No tenderness or masses. No organomegaly.  EXTREMITIES: No edema, cyanosis, or clubbing.    Review of Systems   Constitutional:  Negative for chills, fatigue and fever.   HENT: Negative.     Respiratory:  Negative for cough, shortness of breath and wheezing.    Cardiovascular:  Negative for chest pain, palpitations and leg swelling.   Gastrointestinal:  Positive for abdominal pain. Negative for change in bowel habit, constipation, diarrhea, nausea, vomiting, reflux and change in bowel habit.   Genitourinary: Negative.    Musculoskeletal: Negative.    Neurological: Negative.    Psychiatric/Behavioral: Negative.     All other systems reviewed and are negative.    IMPRESSION  1. Annual physical exam    2. Liver mass    3. Chronic hepatitis C with cirrhosis    4. Primary hypertension    5. Asymptomatic postmenopausal state            PLAN      1. Annual physical exam      2. Liver mass  3. Chronic hepatitis C with cirrhosis    MRI from 12/2023 reviewed- hepatic cirrhosis, 8.3 cm enhancing mass in inferior aspect of right hepatic lobe, CT guided biopsy should be considered to exclude hepatocellular carcinoma    Followed by hepatology (Dr White)    Advised to make follow up appointment with hepatology for next steps      4. Primary hypertension    Stable    Continue current medications        5. Asymptomatic postmenopausal state    - DXA Bone Density Spine And Hip; Future         SCREENINGS      Immunizations:   UTD with covid, pneumonia vaccines      Age/demographic appropriate health maintenance:    Dexa  scan ordered    Health Maintenance Due   Topic Date Due    Lipid Panel  Never done    DEXA Scan  06/20/2021             Raimundo Zuniga    H/O bilateral cataract extraction    Parotid gland enlargement  s/p removed 22 years ago  S/P eye surgery  right eye retina  S/P hernia repair    S/P prostatectomy

## 2023-03-07 ENCOUNTER — TRANSCRIPTION ENCOUNTER (OUTPATIENT)
Age: 68
End: 2023-03-07

## 2024-10-10 NOTE — PROGRESS NOTE ADULT - SUBJECTIVE AND OBJECTIVE BOX
Patient is a 64y old  Male who presents with a chief complaint of cough, fever, syncope, sob (01 Apr 2020 15:23)      SUBJECTIVE / OVERNIGHT EVENTS:  No chest pain. No shortness of breath. No complaints. No events overnight.     MEDICATIONS  (STANDING):  apixaban 5 milliGRAM(s) Oral every 12 hours  atorvastatin 40 milliGRAM(s) Oral at bedtime  metoprolol tartrate 50 milliGRAM(s) Oral two times a day    MEDICATIONS  (PRN):  acetaminophen   Tablet .. 650 milliGRAM(s) Oral every 6 hours PRN Temp greater or equal to 38C (100.4F), Mild Pain (1 - 3), Moderate Pain (4 - 6)  guaiFENesin   Syrup  (Sugar-Free) 200 milliGRAM(s) Oral every 6 hours PRN Cough  loperamide 2 milliGRAM(s) Oral three times a day PRN Diarrhea      Vital Signs Last 24 Hrs  T(C): 36.8 (01 Apr 2020 14:00), Max: 37.2 (01 Apr 2020 04:29)  T(F): 98.3 (01 Apr 2020 14:00), Max: 98.9 (01 Apr 2020 04:29)  HR: 64 (01 Apr 2020 17:37) (57 - 64)  BP: 102/63 (01 Apr 2020 17:37) (95/60 - 118/66)  BP(mean): --  RR: 18 (01 Apr 2020 17:37) (18 - 18)  SpO2: 96% (01 Apr 2020 17:37) (94% - 97%)  CAPILLARY BLOOD GLUCOSE        I&O's Summary    31 Mar 2020 07:01  -  01 Apr 2020 07:00  --------------------------------------------------------  IN: 600 mL / OUT: 2300 mL / NET: -1700 mL    01 Apr 2020 07:01  -  01 Apr 2020 18:50  --------------------------------------------------------  IN: 400 mL / OUT: 0 mL / NET: 400 mL          LABS:                        11.1   6.19  )-----------( 147      ( 01 Apr 2020 15:26 )             34.7     04-01    135  |  103  |  51<H>  ----------------------------<  88  4.0   |  17<L>  |  1.98<H>    Ca    8.4      01 Apr 2020 06:52  Phos  4.1     04-01  Mg     2.2     04-01    TPro  6.1  /  Alb  2.4<L>  /  TBili  0.2  /  DBili  x   /  AST  50<H>  /  ALT  41  /  AlkPhos  49  04-01              RADIOLOGY & ADDITIONAL TESTS:    Imaging Personally Reviewed:    Consultant(s) Notes Reviewed:      Care Discussed with Consultants/Other Providers:
Patient is a 64y old  Male who presents with a chief complaint of cough, fever, syncope, sob (02 Apr 2020 12:24)      SUBJECTIVE / OVERNIGHT EVENTS:  pt had visual disturbances (floaters).  he was evaluated by optho.  can follow up as out pt.     MEDICATIONS  (STANDING):  apixaban 5 milliGRAM(s) Oral every 12 hours  atorvastatin 40 milliGRAM(s) Oral at bedtime  metoprolol tartrate 50 milliGRAM(s) Oral two times a day    MEDICATIONS  (PRN):  acetaminophen   Tablet .. 650 milliGRAM(s) Oral every 6 hours PRN Temp greater or equal to 38C (100.4F), Mild Pain (1 - 3), Moderate Pain (4 - 6)  guaiFENesin   Syrup  (Sugar-Free) 200 milliGRAM(s) Oral every 6 hours PRN Cough  loperamide 2 milliGRAM(s) Oral three times a day PRN Diarrhea      Vital Signs Last 24 Hrs  T(C): 36.8 (02 Apr 2020 16:45), Max: 36.8 (01 Apr 2020 20:39)  T(F): 98.2 (02 Apr 2020 16:45), Max: 98.3 (02 Apr 2020 04:35)  HR: 65 (02 Apr 2020 16:45) (60 - 66)  BP: 118/75 (02 Apr 2020 16:45) (102/63 - 118/75)  BP(mean): --  RR: 18 (02 Apr 2020 16:45) (18 - 18)  SpO2: 98% (02 Apr 2020 16:45) (95% - 98%)  CAPILLARY BLOOD GLUCOSE        I&O's Summary    01 Apr 2020 07:01  -  02 Apr 2020 07:00  --------------------------------------------------------  IN: 1230 mL / OUT: 250 mL / NET: 980 mL    02 Apr 2020 07:01  -  02 Apr 2020 17:16  --------------------------------------------------------  IN: 480 mL / OUT: 0 mL / NET: 480 mL          LABS:                        11.4   5.99  )-----------( 157      ( 02 Apr 2020 07:24 )             35.8     04-02    138  |  105  |  48<H>  ----------------------------<  90  4.0   |  18<L>  |  1.78<H>    Ca    8.8      02 Apr 2020 07:24  Phos  4.1     04-01  Mg     2.2     04-01    TPro  6.2  /  Alb  2.7<L>  /  TBili  0.3  /  DBili  <0.1  /  AST  129<H>  /  ALT  108<H>  /  AlkPhos  65  04-02              RADIOLOGY & ADDITIONAL TESTS:    Imaging Personally Reviewed:    Consultant(s) Notes Reviewed:      Care Discussed with Consultants/Other Providers:
Patient is a 64y old  Male who presents with a chief complaint of cough, fever, syncope, sob (27 Mar 2020 14:03)      SUBJECTIVE / OVERNIGHT EVENTS: cough , fever and sob    MEDICATIONS  (STANDING):  atorvastatin 40 milliGRAM(s) Oral at bedtime  heparin  Injectable 5000 Unit(s) SubCutaneous every 8 hours    MEDICATIONS  (PRN):  acetaminophen   Tablet .. 650 milliGRAM(s) Oral every 6 hours PRN Temp greater or equal to 38C (100.4F), Mild Pain (1 - 3), Moderate Pain (4 - 6)  guaiFENesin   Syrup  (Sugar-Free) 200 milliGRAM(s) Oral every 6 hours PRN Cough      Vital Signs Last 24 Hrs  T(C): 38.2 (28 Mar 2020 14:38), Max: 38.2 (27 Mar 2020 20:40)  T(F): 100.7 (28 Mar 2020 14:38), Max: 100.7 (27 Mar 2020 20:40)  HR: 94 (28 Mar 2020 12:45) (90 - 94)  BP: 108/85 (28 Mar 2020 14:38) (108/85 - 139/90)  BP(mean): --  RR: 22 (28 Mar 2020 14:38) (20 - 22)  SpO2: 96% (28 Mar 2020 14:38) (95% - 97%)  CAPILLARY BLOOD GLUCOSE        I&O's Summary    27 Mar 2020 07:01  -  28 Mar 2020 07:00  --------------------------------------------------------  IN: 480 mL / OUT: 0 mL / NET: 480 mL    28 Mar 2020 07:01  -  28 Mar 2020 14:58  --------------------------------------------------------  IN: 480 mL / OUT: 0 mL / NET: 480 mL        PHYSICAL EXAM:  deferred due to limit exposure due to covid positive    LABS:                        11.9   4.23  )-----------( 130      ( 28 Mar 2020 06:57 )             37.5     03-28    139  |  107  |  30<H>  ----------------------------<  99  4.2   |  18<L>  |  1.93<H>    Ca    8.6      28 Mar 2020 07:04  Mg     1.7     03-28    TPro  6.2  /  Alb  3.2<L>  /  TBili  0.2  /  DBili  x   /  AST  30  /  ALT  25  /  AlkPhos  58  03-28              RADIOLOGY & ADDITIONAL TESTS:    Imaging Personally Reviewed:    Consultant(s) Notes Reviewed:      Care Discussed with Consultants/Other Providers:
Patient is a 64y old  Male who presents with a chief complaint of cough, fever, syncope, sob (29 Mar 2020 12:58)      SUBJECTIVE / OVERNIGHT EVENTS:  c/o diarrhea    MEDICATIONS  (STANDING):  apixaban 5 milliGRAM(s) Oral every 12 hours  atorvastatin 40 milliGRAM(s) Oral at bedtime  loperamide 2 milliGRAM(s) Oral three times a day  metoprolol tartrate 50 milliGRAM(s) Oral two times a day  sodium chloride 0.9%. 1000 milliLiter(s) (75 mL/Hr) IV Continuous <Continuous>    MEDICATIONS  (PRN):  acetaminophen   Tablet .. 650 milliGRAM(s) Oral every 6 hours PRN Temp greater or equal to 38C (100.4F), Mild Pain (1 - 3), Moderate Pain (4 - 6)  guaiFENesin   Syrup  (Sugar-Free) 200 milliGRAM(s) Oral every 6 hours PRN Cough      Vital Signs Last 24 Hrs  T(C): 37 (29 Mar 2020 14:00), Max: 38.6 (29 Mar 2020 12:59)  T(F): 98.6 (29 Mar 2020 14:00), Max: 101.4 (29 Mar 2020 12:59)  HR: 70 (29 Mar 2020 12:59) (70 - 104)  BP: 120/79 (29 Mar 2020 12:59) (99/63 - 125/63)  BP(mean): --  RR: 18 (29 Mar 2020 12:59) (18 - 22)  SpO2: 96% (29 Mar 2020 12:59) (96% - 97%)  CAPILLARY BLOOD GLUCOSE        I&O's Summary    28 Mar 2020 07:01  -  29 Mar 2020 07:00  --------------------------------------------------------  IN: 1145 mL / OUT: 340 mL / NET: 805 mL    29 Mar 2020 07:01  -  29 Mar 2020 17:49  --------------------------------------------------------  IN: 440 mL / OUT: 600 mL / NET: -160 mL        PHYSICAL EXAM:  GENERAL: NAD, well-developed  HEAD:  Atraumatic, Normocephalic  EYES: EOMI, PERRLA, conjunctiva and sclera clear  NECK: Supple, No JVD  CHEST/LUNG: Clear to auscultation bilaterally; No wheeze  HEART: Regular rate and rhythm; No murmurs, rubs, or gallops  ABDOMEN: Soft, Nontender, Nondistended; Bowel sounds present  EXTREMITIES:  2+ Peripheral Pulses, No clubbing, cyanosis, or edema  PSYCH: AAOx3  NEUROLOGY: non-focal  SKIN: No rashes or lesions    LABS:                        12.4   5.02  )-----------( 126      ( 29 Mar 2020 07:21 )             37.8     03-29    139  |  106  |  31<H>  ----------------------------<  98  4.0   |  17<L>  |  1.93<H>    Ca    8.5      29 Mar 2020 07:21  Phos  3.3     03-29  Mg     1.8     03-29    TPro  6.0  /  Alb  3.1<L>  /  TBili  0.2  /  DBili  x   /  AST  31  /  ALT  26  /  AlkPhos  56  03-29              RADIOLOGY & ADDITIONAL TESTS:    Imaging Personally Reviewed:    Consultant(s) Notes Reviewed:      Care Discussed with Consultants/Other Providers:
Patient is a 64y old  Male who presents with a chief complaint of cough, fever, syncope, sob (30 Mar 2020 12:41)      SUBJECTIVE / OVERNIGHT EVENTS:  diarrhea has improved.  pt feels very weak and dizzy    MEDICATIONS  (STANDING):  apixaban 5 milliGRAM(s) Oral every 12 hours  atorvastatin 40 milliGRAM(s) Oral at bedtime  loperamide 2 milliGRAM(s) Oral three times a day  metoprolol tartrate 50 milliGRAM(s) Oral two times a day  sodium chloride 0.9%. 1000 milliLiter(s) (75 mL/Hr) IV Continuous <Continuous>    MEDICATIONS  (PRN):  acetaminophen   Tablet .. 650 milliGRAM(s) Oral every 6 hours PRN Temp greater or equal to 38C (100.4F), Mild Pain (1 - 3), Moderate Pain (4 - 6)  guaiFENesin   Syrup  (Sugar-Free) 200 milliGRAM(s) Oral every 6 hours PRN Cough      Vital Signs Last 24 Hrs  T(C): 39.3 (30 Mar 2020 11:06), Max: 39.3 (30 Mar 2020 11:06)  T(F): 102.7 (30 Mar 2020 11:06), Max: 102.7 (30 Mar 2020 11:06)  HR: 74 (30 Mar 2020 11:06) (74 - 88)  BP: 107/65 (30 Mar 2020 11:06) (97/56 - 115/73)  BP(mean): --  RR: 18 (30 Mar 2020 11:06) (18 - 18)  SpO2: 92% (30 Mar 2020 11:06) (91% - 95%)  CAPILLARY BLOOD GLUCOSE        I&O's Summary    29 Mar 2020 07:01  -  30 Mar 2020 07:00  --------------------------------------------------------  IN: 680 mL / OUT: 600 mL / NET: 80 mL    30 Mar 2020 07:01  -  30 Mar 2020 18:51  --------------------------------------------------------  IN: 440 mL / OUT: 0 mL / NET: 440 mL          LABS:                        12.4   5.02  )-----------( 126      ( 29 Mar 2020 07:21 )             37.8     03-29    139  |  106  |  31<H>  ----------------------------<  98  4.0   |  17<L>  |  1.93<H>    Ca    8.5      29 Mar 2020 07:21  Phos  3.3     03-29  Mg     1.8     03-29    TPro  6.0  /  Alb  3.1<L>  /  TBili  0.2  /  DBili  x   /  AST  31  /  ALT  26  /  AlkPhos  56  03-29              RADIOLOGY & ADDITIONAL TESTS:    Imaging Personally Reviewed:    Consultant(s) Notes Reviewed:      Care Discussed with Consultants/Other Providers:
Patient is a 64y old  Male who presents with a chief complaint of cough, fever, syncope, sob (31 Mar 2020 12:32)      SUBJECTIVE / OVERNIGHT EVENTS:  feels better. requires supplemental O2,  diarrhea resolved    MEDICATIONS  (STANDING):  apixaban 5 milliGRAM(s) Oral every 12 hours  atorvastatin 40 milliGRAM(s) Oral at bedtime  metoprolol tartrate 50 milliGRAM(s) Oral two times a day    MEDICATIONS  (PRN):  acetaminophen   Tablet .. 650 milliGRAM(s) Oral every 6 hours PRN Temp greater or equal to 38C (100.4F), Mild Pain (1 - 3), Moderate Pain (4 - 6)  guaiFENesin   Syrup  (Sugar-Free) 200 milliGRAM(s) Oral every 6 hours PRN Cough  loperamide 2 milliGRAM(s) Oral three times a day PRN Diarrhea      Vital Signs Last 24 Hrs  T(C): 36.8 (31 Mar 2020 19:50), Max: 39.2 (30 Mar 2020 20:44)  T(F): 98.3 (31 Mar 2020 19:50), Max: 102.5 (30 Mar 2020 20:44)  HR: 57 (31 Mar 2020 19:50) (57 - 75)  BP: 95/60 (31 Mar 2020 19:50) (94/60 - 119/74)  BP(mean): --  RR: 18 (31 Mar 2020 19:50) (18 - 18)  SpO2: 97% (31 Mar 2020 19:50) (94% - 97%)  CAPILLARY BLOOD GLUCOSE        I&O's Summary    30 Mar 2020 07:01  -  31 Mar 2020 07:00  --------------------------------------------------------  IN: 680 mL / OUT: 300 mL / NET: 380 mL    31 Mar 2020 07:01  -  31 Mar 2020 20:26  --------------------------------------------------------  IN: 600 mL / OUT: 1650 mL / NET: -1050 mL          LABS:                    RADIOLOGY & ADDITIONAL TESTS:    Imaging Personally Reviewed:    Consultant(s) Notes Reviewed:      Care Discussed with Consultants/Other Providers:
Subjective: Patient seen and examined. No new events except as noted.   Converted to Sinus overnight   feels weak again   diarrhea since this am       REVIEW OF SYSTEMS:    CONSTITUTIONAL: +weakness, fevers or chills  EYES/ENT: No visual changes;  No vertigo or throat pain   NECK: No pain or stiffness  RESPIRATORY: No cough, wheezing, hemoptysis; No shortness of breath  CARDIOVASCULAR: No chest pain or palpitations  GASTROINTESTINAL: No abdominal or epigastric pain. No nausea, vomiting, or hematemesis;+ diarrhea or constipation. No melena or hematochezia.  GENITOURINARY: No dysuria, frequency or hematuria  NEUROLOGICAL: No numbness or weakness  SKIN: No itching, burning, rashes, or lesions   All other review of systems is negative unless indicated above.    MEDICATIONS:  MEDICATIONS  (STANDING):  apixaban 5 milliGRAM(s) Oral every 12 hours  atorvastatin 40 milliGRAM(s) Oral at bedtime  metoprolol tartrate 50 milliGRAM(s) Oral two times a day  sodium chloride 0.9%. 1000 milliLiter(s) (75 mL/Hr) IV Continuous <Continuous>      PHYSICAL EXAM:  T(C): 36.9 (03-29-20 @ 04:55), Max: 38.2 (03-28-20 @ 14:38)  HR: 75 (03-29-20 @ 04:55) (75 - 104)  BP: 99/63 (03-29-20 @ 04:55) (99/63 - 125/63)  RR: 20 (03-29-20 @ 04:55) (20 - 22)  SpO2: 97% (03-29-20 @ 04:55) (96% - 97%)  Wt(kg): --  I&O's Summary    28 Mar 2020 07:01  -  29 Mar 2020 07:00  --------------------------------------------------------  IN: 1145 mL / OUT: 340 mL / NET: 805 mL    29 Mar 2020 07:01  -  29 Mar 2020 12:58  --------------------------------------------------------  IN: 440 mL / OUT: 600 mL / NET: -160 mL          Appearance: NAD	  HEENT:   Normal oral mucosa, PERRL, EOMI	  Lymphatic: No lymphadenopathy , no edema  Cardiovascular: Normal S1 S2, No JVD, No murmurs , Peripheral pulses palpable 2+ bilaterally  Respiratory: Decreased bs and effort	  Gastrointestinal:  Soft, Non-tender, + BS	  Skin: No rashes, No ecchymoses, No cyanosis, warm to touch  Musculoskeletal: Normal range of motion, normal strength  Psychiatry:  Mood & affect appropriate  Ext: No edema      LABS:    CARDIAC MARKERS:                                12.4   5.02  )-----------( 126      ( 29 Mar 2020 07:21 )             37.8     03-29    139  |  106  |  31<H>  ----------------------------<  98  4.0   |  17<L>  |  1.93<H>    Ca    8.5      29 Mar 2020 07:21  Phos  3.3     03-29  Mg     1.8     03-29    TPro  6.0  /  Alb  3.1<L>  /  TBili  0.2  /  DBili  x   /  AST  31  /  ALT  26  /  AlkPhos  56  03-29    proBNP:   Lipid Profile:   HgA1c:   TSH: Thyroid Stimulating Hormone, Serum: 2.01 uIU/mL (03-29 @ 12:00)              TELEMETRY: 	  Sr, PACs  ECG:  	  RADIOLOGY:   DIAGNOSTIC TESTING:  [ ] Echocardiogram:  [ ]  Catheterization:  [ ] Stress Test:    OTHER:
Subjective: Patient seen and examined. No new events except as noted.   Diarrhea   + fever and chills   no cp or sob     REVIEW OF SYSTEMS:    CONSTITUTIONAL: + weakness,+++ fevers or chills  EYES/ENT: No visual changes;  No vertigo or throat pain   NECK: No pain or stiffness  RESPIRATORY: No cough, wheezing, hemoptysis; No shortness of breath  CARDIOVASCULAR: No chest pain or palpitations  GASTROINTESTINAL: No abdominal or epigastric pain. No nausea, vomiting, or hematemesis;+ diarrhea or constipation. No melena or hematochezia.  GENITOURINARY: No dysuria, frequency or hematuria  NEUROLOGICAL: No numbness or weakness  SKIN: No itching, burning, rashes, or lesions   All other review of systems is negative unless indicated above.    MEDICATIONS:  MEDICATIONS  (STANDING):  apixaban 5 milliGRAM(s) Oral every 12 hours  atorvastatin 40 milliGRAM(s) Oral at bedtime  loperamide 2 milliGRAM(s) Oral three times a day  metoprolol tartrate 50 milliGRAM(s) Oral two times a day  sodium chloride 0.9%. 1000 milliLiter(s) (75 mL/Hr) IV Continuous <Continuous>      PHYSICAL EXAM:  T(C): 39.3 (03-30-20 @ 11:06), Max: 39.3 (03-30-20 @ 11:06)  HR: 74 (03-30-20 @ 11:06) (70 - 88)  BP: 107/65 (03-30-20 @ 11:06) (97/56 - 120/79)  RR: 18 (03-30-20 @ 11:06) (18 - 18)  SpO2: 92% (03-30-20 @ 11:06) (91% - 96%)  Wt(kg): --  I&O's Summary    29 Mar 2020 07:01  -  30 Mar 2020 07:00  --------------------------------------------------------  IN: 680 mL / OUT: 600 mL / NET: 80 mL    30 Mar 2020 07:01  -  30 Mar 2020 12:41  --------------------------------------------------------  IN: 240 mL / OUT: 0 mL / NET: 240 mL          Appearance: Normal	  HEENT:   Normal oral mucosa, PERRL, EOMI	  Lymphatic: No lymphadenopathy , no edema  Cardiovascular: Normal S1 S2, No JVD, No murmurs , Peripheral pulses palpable 2+ bilaterally  Respiratory: Lungs clear to auscultation, normal effort 	  Gastrointestinal:  Soft, Non-tender, + BS	  Skin: No rashes, No ecchymoses, No cyanosis, warm to touch  Musculoskeletal: Normal range of motion, normal strength  Psychiatry:  Mood & affect appropriate  Ext: No edema      LABS:    CARDIAC MARKERS:                                12.4   5.02  )-----------( 126      ( 29 Mar 2020 07:21 )             37.8     03-29    139  |  106  |  31<H>  ----------------------------<  98  4.0   |  17<L>  |  1.93<H>    Ca    8.5      29 Mar 2020 07:21  Phos  3.3     03-29  Mg     1.8     03-29    TPro  6.0  /  Alb  3.1<L>  /  TBili  0.2  /  DBili  x   /  AST  31  /  ALT  26  /  AlkPhos  56  03-29    proBNP:   Lipid Profile:   HgA1c:   TSH:             TELEMETRY: 	SR    ECG:  	  RADIOLOGY:   DIAGNOSTIC TESTING:  [ ] Echocardiogram:  [ ]  Catheterization:  [ ] Stress Test:    OTHER:
Subjective: Patient seen and examined. No new events except as noted.   resting comfortably     REVIEW OF SYSTEMS:    CONSTITUTIONAL:+ weakness, fevers or chills  EYES/ENT: No visual changes;  No vertigo or throat pain   NECK: No pain or stiffness  RESPIRATORY: No cough, wheezing, hemoptysis; No shortness of breath  CARDIOVASCULAR: No chest pain or palpitations  GASTROINTESTINAL: No abdominal or epigastric pain. No nausea, vomiting, or hematemesis; No diarrhea or constipation. No melena or hematochezia.  GENITOURINARY: No dysuria, frequency or hematuria  NEUROLOGICAL: No numbness or weakness  SKIN: No itching, burning, rashes, or lesions   All other review of systems is negative unless indicated above.    MEDICATIONS:  MEDICATIONS  (STANDING):  apixaban 5 milliGRAM(s) Oral every 12 hours  atorvastatin 40 milliGRAM(s) Oral at bedtime  metoprolol tartrate 50 milliGRAM(s) Oral two times a day      PHYSICAL EXAM:  T(C): 36.6 (04-02-20 @ 11:22), Max: 36.8 (04-01-20 @ 14:00)  HR: 61 (04-02-20 @ 11:22) (60 - 66)  BP: 112/77 (04-02-20 @ 11:22) (102/63 - 112/77)  RR: 18 (04-02-20 @ 11:22) (18 - 18)  SpO2: 95% (04-02-20 @ 11:22) (94% - 96%)  Wt(kg): --  I&O's Summary    01 Apr 2020 07:01  -  02 Apr 2020 07:00  --------------------------------------------------------  IN: 1230 mL / OUT: 250 mL / NET: 980 mL          Appearance: Normal	  HEENT:   Normal oral mucosa, PERRL, EOMI	  Lymphatic: No lymphadenopathy , no edema  Cardiovascular: Normal S1 S2, No JVD, No murmurs , Peripheral pulses palpable 2+ bilaterally  Respiratory: Crackles bl	  Gastrointestinal:  Soft, Non-tender, + BS	  Skin: No rashes, No ecchymoses, No cyanosis, warm to touch  Musculoskeletal: Normal range of motion, normal strength  Psychiatry:  Mood & affect appropriate  Ext: No edema      LABS:    CARDIAC MARKERS:                                11.4   5.99  )-----------( 157      ( 02 Apr 2020 07:24 )             35.8     04-02    138  |  105  |  48<H>  ----------------------------<  90  4.0   |  18<L>  |  1.78<H>    Ca    8.8      02 Apr 2020 07:24  Phos  4.1     04-01  Mg     2.2     04-01    TPro  6.2  /  Alb  2.7<L>  /  TBili  0.3  /  DBili  <0.1  /  AST  129<H>  /  ALT  108<H>  /  AlkPhos  65  04-02    proBNP:   Lipid Profile:   HgA1c:   TSH:             TELEMETRY: 	SR    ECG:  	  RADIOLOGY:   DIAGNOSTIC TESTING:  [ ] Echocardiogram:  [ ]  Catheterization:  [ ] Stress Test:    OTHER:
Subjective: Patient seen and examined. No new events except as noted.   resting comfortably   maintaining sinus     REVIEW OF SYSTEMS:    CONSTITUTIONAL:+ weakness, fevers or chills  EYES/ENT: No visual changes;  No vertigo or throat pain   NECK: No pain or stiffness  RESPIRATORY:+ cough, wheezing, hemoptysis; No shortness of breath  CARDIOVASCULAR: No chest pain or palpitations  GASTROINTESTINAL: No abdominal or epigastric pain. No nausea, vomiting, or hematemesis; No diarrhea or constipation. No melena or hematochezia.  GENITOURINARY: No dysuria, frequency or hematuria  NEUROLOGICAL: No numbness or weakness  SKIN: No itching, burning, rashes, or lesions   All other review of systems is negative unless indicated above.    MEDICATIONS:  MEDICATIONS  (STANDING):  apixaban 5 milliGRAM(s) Oral every 12 hours  atorvastatin 40 milliGRAM(s) Oral at bedtime  loperamide 2 milliGRAM(s) Oral three times a day  metoprolol tartrate 50 milliGRAM(s) Oral two times a day      PHYSICAL EXAM:  T(C): 37 (03-31-20 @ 04:53), Max: 39.2 (03-30-20 @ 20:44)  HR: 65 (03-31-20 @ 04:53) (65 - 75)  BP: 98/66 (03-31-20 @ 04:53) (94/60 - 119/74)  RR: 18 (03-31-20 @ 04:53) (18 - 18)  SpO2: 97% (03-31-20 @ 04:53) (94% - 97%)  Wt(kg): --  I&O's Summary    30 Mar 2020 07:01  -  31 Mar 2020 07:00  --------------------------------------------------------  IN: 680 mL / OUT: 300 mL / NET: 380 mL    31 Mar 2020 07:01  -  31 Mar 2020 12:32  --------------------------------------------------------  IN: 240 mL / OUT: 675 mL / NET: -435 mL          Appearance: NAD  HEENT:   Normal oral mucosa, PERRL, EOMI	  Lymphatic: No lymphadenopathy , no edema  Cardiovascular: Normal S1 S2, No JVD, No murmurs , Peripheral pulses palpable 2+ bilaterally  Respiratory: Decreased bs 	  Gastrointestinal:  Soft, Non-tender, + BS	  Skin: No rashes, No ecchymoses, No cyanosis, warm to touch  Musculoskeletal: Normal range of motion, normal strength  Psychiatry:  Mood & affect appropriate  Ext: No edema      LABS:    CARDIAC MARKERS:                  proBNP:   Lipid Profile:   HgA1c:   TSH:             TELEMETRY: 	  SR, PAcs,   ECG:  	  RADIOLOGY:   DIAGNOSTIC TESTING:  [ ] Echocardiogram:  [ ]  Catheterization:  [ ] Stress Test:    OTHER:
Subjective: Patient seen and examined. No new events except as noted.   very lethargic and sleepy   Diarrhea resolved       REVIEW OF SYSTEMS:    CONSTITUTIONAL:+ weakness, fevers or chills  EYES/ENT: No visual changes;  No vertigo or throat pain   NECK: No pain or stiffness  RESPIRATORY: No cough, wheezing, hemoptysis; No shortness of breath  CARDIOVASCULAR: No chest pain or palpitations  GASTROINTESTINAL: No abdominal or epigastric pain. No nausea, vomiting, or hematemesis; No diarrhea or constipation. No melena or hematochezia.  GENITOURINARY: No dysuria, frequency or hematuria  NEUROLOGICAL: No numbness or weakness  SKIN: No itching, burning, rashes, or lesions   All other review of systems is negative unless indicated above.    MEDICATIONS:  MEDICATIONS  (STANDING):  apixaban 5 milliGRAM(s) Oral every 12 hours  atorvastatin 40 milliGRAM(s) Oral at bedtime  metoprolol tartrate 50 milliGRAM(s) Oral two times a day      PHYSICAL EXAM:  T(C): 37.2 (04-01-20 @ 04:29), Max: 37.2 (04-01-20 @ 04:29)  HR: 62 (04-01-20 @ 04:29) (57 - 64)  BP: 118/66 (04-01-20 @ 04:29) (95/60 - 118/66)  RR: 18 (04-01-20 @ 04:29) (18 - 18)  SpO2: 97% (04-01-20 @ 04:29) (97% - 97%)  Wt(kg): --  I&O's Summary    31 Mar 2020 07:01  -  01 Apr 2020 07:00  --------------------------------------------------------  IN: 600 mL / OUT: 2300 mL / NET: -1700 mL    01 Apr 2020 07:01  -  01 Apr 2020 12:35  --------------------------------------------------------  IN: 200 mL / OUT: 0 mL / NET: 200 mL          Appearance: Normal	  HEENT:   Normal oral mucosa, PERRL, EOMI	  Lymphatic: No lymphadenopathy , no edema  Cardiovascular: Normal S1 S2, No JVD, No murmurs , Peripheral pulses palpable 2+ bilaterally  Respiratory: Decreased bs 	  Gastrointestinal:  Soft, Non-tender, + BS	  Skin: No rashes, No ecchymoses, No cyanosis, warm to touch  Musculoskeletal: Normal range of motion, normal strength  Psychiatry:  Mood & affect appropriate  Ext: No edema      LABS:    CARDIAC MARKERS:                                11.1   5.97  )-----------( 138      ( 01 Apr 2020 07:02 )             33.3     04-01    135  |  103  |  51<H>  ----------------------------<  88  4.0   |  17<L>  |  1.98<H>    Ca    8.4      01 Apr 2020 06:52  Phos  4.1     04-01  Mg     2.2     04-01    TPro  6.1  /  Alb  2.4<L>  /  TBili  0.2  /  DBili  x   /  AST  50<H>  /  ALT  41  /  AlkPhos  49  04-01    proBNP:   Lipid Profile:   HgA1c:   TSH:             TELEMETRY: SR	    ECG:  	  RADIOLOGY:   DIAGNOSTIC TESTING:  [ ] Echocardiogram:  [ ]  Catheterization:  [ ] Stress Test:    OTHER:
36.7

## 2025-05-03 ENCOUNTER — INPATIENT (INPATIENT)
Facility: HOSPITAL | Age: 70
LOS: 1 days | Discharge: HOME | DRG: 392 | End: 2025-05-05
Attending: STUDENT IN AN ORGANIZED HEALTH CARE EDUCATION/TRAINING PROGRAM | Admitting: HOSPITALIST
Payer: MEDICARE

## 2025-05-03 VITALS
DIASTOLIC BLOOD PRESSURE: 97 MMHG | TEMPERATURE: 98 F | WEIGHT: 255.96 LBS | RESPIRATION RATE: 19 BRPM | HEIGHT: 70 IN | OXYGEN SATURATION: 96 % | HEART RATE: 79 BPM | SYSTOLIC BLOOD PRESSURE: 149 MMHG

## 2025-05-03 DIAGNOSIS — J44.9 CHRONIC OBSTRUCTIVE PULMONARY DISEASE, UNSPECIFIED: ICD-10-CM

## 2025-05-03 DIAGNOSIS — I10 ESSENTIAL (PRIMARY) HYPERTENSION: ICD-10-CM

## 2025-05-03 DIAGNOSIS — N18.4 CHRONIC KIDNEY DISEASE, STAGE 4 (SEVERE): ICD-10-CM

## 2025-05-03 DIAGNOSIS — E11.9 TYPE 2 DIABETES MELLITUS WITHOUT COMPLICATIONS: ICD-10-CM

## 2025-05-03 DIAGNOSIS — R33.9 RETENTION OF URINE, UNSPECIFIED: ICD-10-CM

## 2025-05-03 DIAGNOSIS — I48.0 PAROXYSMAL ATRIAL FIBRILLATION: ICD-10-CM

## 2025-05-03 DIAGNOSIS — Z29.9 ENCOUNTER FOR PROPHYLACTIC MEASURES, UNSPECIFIED: ICD-10-CM

## 2025-05-03 DIAGNOSIS — K59.00 CONSTIPATION, UNSPECIFIED: ICD-10-CM

## 2025-05-03 DIAGNOSIS — E78.5 HYPERLIPIDEMIA, UNSPECIFIED: ICD-10-CM

## 2025-05-03 DIAGNOSIS — K52.89 OTHER SPECIFIED NONINFECTIVE GASTROENTERITIS AND COLITIS: ICD-10-CM

## 2025-05-03 LAB
ALBUMIN SERPL ELPH-MCNC: 3.8 G/DL — SIGNIFICANT CHANGE UP (ref 3.3–5)
ALP SERPL-CCNC: 72 U/L — SIGNIFICANT CHANGE UP (ref 40–120)
ALT FLD-CCNC: 19 U/L — SIGNIFICANT CHANGE UP (ref 10–45)
ANION GAP SERPL CALC-SCNC: 14 MMOL/L — SIGNIFICANT CHANGE UP (ref 5–17)
APPEARANCE UR: CLEAR — SIGNIFICANT CHANGE UP
AST SERPL-CCNC: 21 U/L — SIGNIFICANT CHANGE UP (ref 10–40)
BACTERIA # UR AUTO: NEGATIVE /HPF — SIGNIFICANT CHANGE UP
BASOPHILS # BLD AUTO: 0.02 K/UL — SIGNIFICANT CHANGE UP (ref 0–0.2)
BASOPHILS NFR BLD AUTO: 0.2 % — SIGNIFICANT CHANGE UP (ref 0–2)
BILIRUB SERPL-MCNC: 0.4 MG/DL — SIGNIFICANT CHANGE UP (ref 0.2–1.2)
BILIRUB UR-MCNC: NEGATIVE — SIGNIFICANT CHANGE UP
BUN SERPL-MCNC: 49 MG/DL — HIGH (ref 7–23)
CALCIUM SERPL-MCNC: 9.3 MG/DL — SIGNIFICANT CHANGE UP (ref 8.4–10.5)
CAST: 0 /LPF — SIGNIFICANT CHANGE UP (ref 0–4)
CHLORIDE SERPL-SCNC: 110 MMOL/L — HIGH (ref 96–108)
CO2 SERPL-SCNC: 17 MMOL/L — LOW (ref 22–31)
COLOR SPEC: YELLOW — SIGNIFICANT CHANGE UP
CREAT SERPL-MCNC: 2.61 MG/DL — HIGH (ref 0.5–1.3)
DIFF PNL FLD: ABNORMAL
EGFR: 25 ML/MIN/1.73M2 — LOW
EGFR: 25 ML/MIN/1.73M2 — LOW
EOSINOPHIL # BLD AUTO: 0.03 K/UL — SIGNIFICANT CHANGE UP (ref 0–0.5)
EOSINOPHIL NFR BLD AUTO: 0.3 % — SIGNIFICANT CHANGE UP (ref 0–6)
GLUCOSE SERPL-MCNC: 95 MG/DL — SIGNIFICANT CHANGE UP (ref 70–99)
GLUCOSE UR QL: 500 MG/DL
HCT VFR BLD CALC: 41.9 % — SIGNIFICANT CHANGE UP (ref 39–50)
HGB BLD-MCNC: 13.7 G/DL — SIGNIFICANT CHANGE UP (ref 13–17)
IMM GRANULOCYTES NFR BLD AUTO: 0.4 % — SIGNIFICANT CHANGE UP (ref 0–0.9)
KETONES UR-MCNC: NEGATIVE MG/DL — SIGNIFICANT CHANGE UP
LEUKOCYTE ESTERASE UR-ACNC: NEGATIVE — SIGNIFICANT CHANGE UP
LYMPHOCYTES # BLD AUTO: 1.32 K/UL — SIGNIFICANT CHANGE UP (ref 1–3.3)
LYMPHOCYTES # BLD AUTO: 13.1 % — SIGNIFICANT CHANGE UP (ref 13–44)
MCHC RBC-ENTMCNC: 30 PG — SIGNIFICANT CHANGE UP (ref 27–34)
MCHC RBC-ENTMCNC: 32.7 G/DL — SIGNIFICANT CHANGE UP (ref 32–36)
MCV RBC AUTO: 91.9 FL — SIGNIFICANT CHANGE UP (ref 80–100)
MONOCYTES # BLD AUTO: 0.52 K/UL — SIGNIFICANT CHANGE UP (ref 0–0.9)
MONOCYTES NFR BLD AUTO: 5.2 % — SIGNIFICANT CHANGE UP (ref 2–14)
NEUTROPHILS # BLD AUTO: 8.13 K/UL — HIGH (ref 1.8–7.4)
NEUTROPHILS NFR BLD AUTO: 80.8 % — HIGH (ref 43–77)
NITRITE UR-MCNC: NEGATIVE — SIGNIFICANT CHANGE UP
NRBC BLD AUTO-RTO: 0 /100 WBCS — SIGNIFICANT CHANGE UP (ref 0–0)
PH UR: 6 — SIGNIFICANT CHANGE UP (ref 5–8)
PLATELET # BLD AUTO: 162 K/UL — SIGNIFICANT CHANGE UP (ref 150–400)
POTASSIUM SERPL-MCNC: 4.6 MMOL/L — SIGNIFICANT CHANGE UP (ref 3.5–5.3)
POTASSIUM SERPL-SCNC: 4.6 MMOL/L — SIGNIFICANT CHANGE UP (ref 3.5–5.3)
PROT SERPL-MCNC: 6.8 G/DL — SIGNIFICANT CHANGE UP (ref 6–8.3)
PROT UR-MCNC: 300 MG/DL
RBC # BLD: 4.56 M/UL — SIGNIFICANT CHANGE UP (ref 4.2–5.8)
RBC # FLD: 13.7 % — SIGNIFICANT CHANGE UP (ref 10.3–14.5)
RBC CASTS # UR COMP ASSIST: 13 /HPF — HIGH (ref 0–4)
SODIUM SERPL-SCNC: 141 MMOL/L — SIGNIFICANT CHANGE UP (ref 135–145)
SP GR SPEC: 1.01 — SIGNIFICANT CHANGE UP (ref 1–1.03)
SQUAMOUS # UR AUTO: 0 /HPF — SIGNIFICANT CHANGE UP (ref 0–5)
UROBILINOGEN FLD QL: 0.2 MG/DL — SIGNIFICANT CHANGE UP (ref 0.2–1)
WBC # BLD: 10.06 K/UL — SIGNIFICANT CHANGE UP (ref 3.8–10.5)
WBC # FLD AUTO: 10.06 K/UL — SIGNIFICANT CHANGE UP (ref 3.8–10.5)
WBC UR QL: 0 /HPF — SIGNIFICANT CHANGE UP (ref 0–5)

## 2025-05-03 PROCEDURE — 99223 1ST HOSP IP/OBS HIGH 75: CPT

## 2025-05-03 PROCEDURE — 99285 EMERGENCY DEPT VISIT HI MDM: CPT | Mod: GC

## 2025-05-03 PROCEDURE — 74176 CT ABD & PELVIS W/O CONTRAST: CPT | Mod: 26

## 2025-05-03 RX ORDER — DEXTROSE 50 % IN WATER 50 %
25 SYRINGE (ML) INTRAVENOUS ONCE
Refills: 0 | Status: DISCONTINUED | OUTPATIENT
Start: 2025-05-03 | End: 2025-05-05

## 2025-05-03 RX ORDER — DEXTROSE 50 % IN WATER 50 %
12.5 SYRINGE (ML) INTRAVENOUS ONCE
Refills: 0 | Status: DISCONTINUED | OUTPATIENT
Start: 2025-05-03 | End: 2025-05-05

## 2025-05-03 RX ORDER — SODIUM BICARBONATE 1 MEQ/ML
1300 SYRINGE (ML) INTRAVENOUS
Refills: 0 | Status: DISCONTINUED | OUTPATIENT
Start: 2025-05-03 | End: 2025-05-05

## 2025-05-03 RX ORDER — SODIUM CHLORIDE 9 G/1000ML
1000 INJECTION, SOLUTION INTRAVENOUS
Refills: 0 | Status: DISCONTINUED | OUTPATIENT
Start: 2025-05-03 | End: 2025-05-05

## 2025-05-03 RX ORDER — MELATONIN 5 MG
3 TABLET ORAL AT BEDTIME
Refills: 0 | Status: DISCONTINUED | OUTPATIENT
Start: 2025-05-03 | End: 2025-05-05

## 2025-05-03 RX ORDER — TIRZEPATIDE 7.5 MG/.5ML
7.5 INJECTION, SOLUTION SUBCUTANEOUS
Refills: 0 | DISCHARGE

## 2025-05-03 RX ORDER — DEXTROSE 50 % IN WATER 50 %
15 SYRINGE (ML) INTRAVENOUS ONCE
Refills: 0 | Status: DISCONTINUED | OUTPATIENT
Start: 2025-05-03 | End: 2025-05-05

## 2025-05-03 RX ORDER — ASPIRIN 325 MG
81 TABLET ORAL DAILY
Refills: 0 | Status: DISCONTINUED | OUTPATIENT
Start: 2025-05-03 | End: 2025-05-05

## 2025-05-03 RX ORDER — ACETAMINOPHEN 500 MG/5ML
650 LIQUID (ML) ORAL EVERY 6 HOURS
Refills: 0 | Status: DISCONTINUED | OUTPATIENT
Start: 2025-05-03 | End: 2025-05-05

## 2025-05-03 RX ORDER — UMECLIDINIUM BROMIDE AND VILANTEROL TRIFENATATE 62.5; 25 UG/1; UG/1
1 POWDER RESPIRATORY (INHALATION)
Refills: 0 | DISCHARGE

## 2025-05-03 RX ORDER — METRONIDAZOLE 250 MG
500 TABLET ORAL ONCE
Refills: 0 | Status: DISCONTINUED | OUTPATIENT
Start: 2025-05-03 | End: 2025-05-03

## 2025-05-03 RX ORDER — CARVEDILOL 3.12 MG/1
12.5 TABLET, FILM COATED ORAL EVERY 12 HOURS
Refills: 0 | Status: DISCONTINUED | OUTPATIENT
Start: 2025-05-03 | End: 2025-05-05

## 2025-05-03 RX ORDER — INSULIN LISPRO 100 U/ML
INJECTION, SOLUTION INTRAVENOUS; SUBCUTANEOUS
Refills: 0 | Status: DISCONTINUED | OUTPATIENT
Start: 2025-05-03 | End: 2025-05-05

## 2025-05-03 RX ORDER — ASPIRIN 325 MG
1 TABLET ORAL
Refills: 0 | DISCHARGE

## 2025-05-03 RX ORDER — EZETIMIBE 10 MG/1
10 TABLET ORAL DAILY
Refills: 0 | Status: DISCONTINUED | OUTPATIENT
Start: 2025-05-03 | End: 2025-05-05

## 2025-05-03 RX ORDER — SODIUM BICARBONATE 1 MEQ/ML
1950 SYRINGE (ML) INTRAVENOUS AT BEDTIME
Refills: 0 | Status: DISCONTINUED | OUTPATIENT
Start: 2025-05-03 | End: 2025-05-05

## 2025-05-03 RX ORDER — CARVEDILOL 3.12 MG/1
1 TABLET, FILM COATED ORAL
Refills: 0 | DISCHARGE

## 2025-05-03 RX ORDER — DAPAGLIFLOZIN 5 MG/1
1 TABLET, FILM COATED ORAL
Refills: 0 | DISCHARGE

## 2025-05-03 RX ORDER — LOSARTAN POTASSIUM 100 MG/1
100 TABLET, FILM COATED ORAL DAILY
Refills: 0 | Status: DISCONTINUED | OUTPATIENT
Start: 2025-05-03 | End: 2025-05-05

## 2025-05-03 RX ORDER — SODIUM BICARBONATE 1 MEQ/ML
650 SYRINGE (ML) INTRAVENOUS
Refills: 0 | DISCHARGE

## 2025-05-03 RX ORDER — ATORVASTATIN CALCIUM 80 MG/1
40 TABLET, FILM COATED ORAL AT BEDTIME
Refills: 0 | Status: DISCONTINUED | OUTPATIENT
Start: 2025-05-03 | End: 2025-05-05

## 2025-05-03 RX ORDER — GLUCAGON 3 MG/1
1 POWDER NASAL ONCE
Refills: 0 | Status: DISCONTINUED | OUTPATIENT
Start: 2025-05-03 | End: 2025-05-05

## 2025-05-03 RX ORDER — CIPROFLOXACIN HCL 250 MG
400 TABLET ORAL ONCE
Refills: 0 | Status: COMPLETED | OUTPATIENT
Start: 2025-05-03 | End: 2025-05-03

## 2025-05-03 RX ORDER — LIDOCAINE HCL/PF 10 MG/ML
10 VIAL (ML) INJECTION ONCE
Refills: 0 | Status: COMPLETED | OUTPATIENT
Start: 2025-05-03 | End: 2025-05-03

## 2025-05-03 RX ADMIN — Medication 10 MILLILITER(S): at 21:01

## 2025-05-03 RX ADMIN — Medication 200 MILLIGRAM(S): at 20:58

## 2025-05-03 NOTE — ED ADULT NURSE REASSESSMENT NOTE - NS ED NURSE REASSESS COMMENT FT1
Report taken from Sharri LAIRD. pt resting in bed comfortably, no complaints at this time. AAOx4. Breathing spontaneous and unlabored. Skin warm, dry, and color normal for race. Pending CTr

## 2025-05-03 NOTE — H&P ADULT - PROBLEM SELECTOR PLAN 6
- ECG  - C/w metoprolol tartrate 50mg BID  - C/w Eliquis/Xarelto? - C/w atorvastatin 40mg QHS  - C/w ezetimibe 10mg daily

## 2025-05-03 NOTE — H&P ADULT - HISTORY OF PRESENT ILLNESS
This is a 68 y/o male w/ PMHx paroxysmal atrial fibrillation on Eliquis/Xarelto, HTN, HLD, and CKD4 who presents for constipation. He had a large BM on Tuesday, and since then has only had very small stools intermittently. He has been taking Colace at home without any improvement, so he decided to take a fleet enema, which still didn't help. He has started to develop abdominal pain in his left lower quadrant as well. He has noticed some passage of liquid stool when he passes gas, also feels he hasn't been able to urinate properly either. Due to these symptoms, he decided to come to the ED for further evaluation.    In the ED, he was afebrile and hemodynamically stable, saturating well on RA. CBC wnl, CMP w/ evidence of CKD4 w/ Cr 2.61. UA w/ moderate blood but negative LE or nitrites. CT A/P revealed a stool-filled rectum distended to 7.7 x 6.1cm in diameter with mild rectal thickening that may represent stercoral colitis. Was given Cipro + Flagyl, as well as a mineral oil enema in the ED. Also needed a dangelo catheter placed due to significant post-void residual after urination.  This is a 70 y/o male w/ PMHx paroxysmal atrial fibrillation s/p ablation on ASA, HTN, HLD, T2DM, CKD4, and COPD who presents for constipation. He had a large BM on Tuesday, and since then has only had very small stools intermittently. He has been taking Colace at home without any improvement, so he decided to take a fleet enema, which still didn't help. He has started to develop abdominal pain in his left lower quadrant as well. He has noticed some passage of liquid stool when he passes gas, also feels he hasn't been able to urinate properly either. Due to these symptoms, he decided to come to the ED for further evaluation.    In the ED, he was afebrile and hemodynamically stable, saturating well on RA. CBC wnl, CMP w/ evidence of CKD4 w/ Cr 2.61. UA w/ moderate blood but negative LE or nitrites. CT A/P revealed a stool-filled rectum distended to 7.7 x 6.1cm in diameter with mild rectal thickening that may represent stercoral colitis. Was given Cipro + Flagyl, as well as a mineral oil enema in the ED. Also needed a dangelo catheter placed due to significant post-void residual after urination.  This is a 70 y/o male w/ PMHx paroxysmal atrial fibrillation s/p ablation on ASA, HTN, HLD, T2DM, CKD4, and COPD who presents for constipation. He had a large BM on Tuesday, and since then has only had very small stools intermittently. He has been taking Colace at home without any improvement, so he decided to take a fleet enema, which still didn't help. He has started to develop abdominal pain in his left lower quadrant as well. He has noticed some passage of liquid stool when he passes gas, also feels he hasn't been able to urinate properly either. Denies any dietary changes or new medications, no new stressors. Does state that his mounjaro dose was increased from 5 to 7.5 recently, however. Due to these symptoms, he decided to come to the ED for further evaluation.    In the ED, he was afebrile and hemodynamically stable, saturating well on RA. CBC wnl, CMP w/ evidence of CKD4 w/ Cr 2.61. UA w/ moderate blood but negative LE or nitrites. CT A/P revealed a stool-filled rectum distended to 7.7 x 6.1cm in diameter with mild rectal thickening that may represent stercoral colitis. Was given Cipro + Flagyl, as well as a mineral oil enema in the ED. Also needed a dangelo catheter placed due to significant post-void residual after urination.

## 2025-05-03 NOTE — H&P ADULT - PROBLEM SELECTOR PLAN 3
- Had post-void residual of 520cc  - Dangelo placed in ED, drained 800cc  - Maintain dangelo catheter for now, can discontinue once patient's stool burden has resolved

## 2025-05-03 NOTE — H&P ADULT - ASSESSMENT
68 y/o male w/ PMHx paroxysmal atrial fibrillation on Eliquis/Xarelto, HTN, HLD, and CKD4 who presents for constipation. CT showing distended stool filled rectum with rectal wall thickening that may represent stercoral colitis. Admitted for management of constipation and stercoral colitis.   70 y/o male w/ PMHx paroxysmal atrial fibrillation s/p ablation on ASA, HTN, HLD, T2DM, CKD4, and COPD who presents for constipation. CT showing distended stool filled rectum with rectal wall thickening that may represent stercoral colitis. Admitted for management of constipation and stercoral colitis.

## 2025-05-03 NOTE — H&P ADULT - PROBLEM SELECTOR PLAN 5
- C/w atorvastatin 40mg QHS  - C/w ezetimibe 10mg daily - C/w carvedilol 6.25mg BID  - C/w losartan 100mg daily - C/w carvedilol 12.5mg BID  - C/w losartan 100mg daily

## 2025-05-03 NOTE — ED ADULT NURSE REASSESSMENT NOTE - NS ED NURSE REASSESS COMMENT FT1
Enema given to pt. Pt held for 10 minutes. Attempted to have bowel movement but difficulty due to pain. very little wet brown stool in bedside commode.

## 2025-05-03 NOTE — H&P ADULT - PROBLEM SELECTOR PLAN 2
- CT showing mild rectal thickening suggestive of stercoral colitis in setting of large stool burden  - S/p Cipro/flagyl  - Monitor off of further abx at the moment, patient afebrile and no leukocytosis  - Should resolve once constipation better treated - CT showing mild rectal thickening suggestive of stercoral colitis in setting of large stool burden  - S/p Cipro/flagyl  - Monitor off of further abx at the moment, patient afebrile and no leukocytosis, no diverticulitis on CT  - Should resolve once constipation better treated

## 2025-05-03 NOTE — ED PROVIDER NOTE - CARE PLAN
1 Principal Discharge DX:	Constipation   Principal Discharge DX:	Constipation  Assessment and plan of treatment:	see MDM

## 2025-05-03 NOTE — H&P ADULT - NSHPPHYSICALEXAM_GEN_ALL_CORE
Vital Signs Last 24 Hrs  T(C): 36.6 (03 May 2025 19:53), Max: 36.6 (03 May 2025 17:20)  T(F): 97.8 (03 May 2025 19:53), Max: 97.9 (03 May 2025 17:20)  HR: 76 (03 May 2025 19:53) (76 - 82)  BP: 129/77 (03 May 2025 19:53) (129/77 - 154/94)  BP(mean): --  RR: 18 (03 May 2025 19:53) (18 - 19)  SpO2: 97% (03 May 2025 19:53) (95% - 97%)    Parameters below as of 03 May 2025 19:53  Patient On (Oxygen Delivery Method): room air        CONSTITUTIONAL: Well-groomed, in no apparent distress  EYES: No conjunctival or scleral injection, non-icteric;   NECK: Trachea midline without palpable neck mass  RESPIRATORY: Breathing comfortably; lungs CTA without wheeze/rhonchi/rales  CARDIOVASCULAR: +S1S2, RRR, no M/G/R; no lower extremity edema  GASTROINTESTINAL: No palpable masses or tenderness, +BS throughout, no rebound/guarding  SKIN: No rashes or ulcers noted  NEUROLOGIC: Sensation intact in LEs b/l to light touch  PSYCHIATRIC: A+O x 3; mood and affect appropriate; appropriate insight and judgment Vital Signs Last 24 Hrs  T(C): 36.6 (03 May 2025 19:53), Max: 36.6 (03 May 2025 17:20)  T(F): 97.8 (03 May 2025 19:53), Max: 97.9 (03 May 2025 17:20)  HR: 76 (03 May 2025 19:53) (76 - 82)  BP: 129/77 (03 May 2025 19:53) (129/77 - 154/94)  BP(mean): --  RR: 18 (03 May 2025 19:53) (18 - 19)  SpO2: 97% (03 May 2025 19:53) (95% - 97%)    Parameters below as of 03 May 2025 19:53  Patient On (Oxygen Delivery Method): room air        CONSTITUTIONAL: Well-groomed, in no apparent distress  EYES: No conjunctival or scleral injection, non-icteric;   NECK: Trachea midline without palpable neck mass  RESPIRATORY: Breathing comfortably; lungs CTA without wheeze/rhonchi/rales  CARDIOVASCULAR: +S1S2, RRR, no M/G/R; no lower extremity edema  GASTROINTESTINAL: No palpable masses or tenderness, +BS throughout, no rebound/guarding  : Pedroza catheter draining yellow urine  SKIN: No rashes or ulcers noted  NEUROLOGIC: Sensation intact in LEs b/l to light touch  PSYCHIATRIC: A+O x 3; mood and affect appropriate; appropriate insight and judgment

## 2025-05-03 NOTE — ED ADULT NURSE NOTE - OBJECTIVE STATEMENT
69 y.o male, A&Ox4, PMH HTN, HLD, CKD stage 3, afib s/p ablation, hernia repair, pt presents to ED c/o constipation. pt states his last normal bowel movement was on Tuesday, since pt states he has not been able to have a full bowel movement and has been having lower abdominal pain/cramping. pt endorses taking colace PO without improvement and states this morning he took a fleet enema and still did not alleviate abdominal pain or bowel movement. pt states he feels like he has to pass gas but when he does he states small amount of loose liquid foul smelling stool comes out. pt states he has been able to tolerate PO, pt denies nausea, vomiting, chest pain, sob, fevers, chills, urinary symptoms. IV access established, pt safety and comfort provided.

## 2025-05-03 NOTE — H&P ADULT - NSHPREVIEWOFSYSTEMS_GEN_ALL_CORE
Review of Systems:   CONSTITUTIONAL: No fever, weight loss  EYES: No eye pain, visual disturbances, or discharge  RESPIRATORY: No SOB. No cough, wheezing, chills or hemoptysis  CARDIOVASCULAR: No chest pain, palpitations, dizziness, or leg swelling  GASTROINTESTINAL: +Constipation; No abdominal or epigastric pain. No nausea, vomiting, or hematemesis; No diarrhea. No melena or hematochezia.  GENITOURINARY: No dysuria, frequency, hematuria, or incontinence  NEUROLOGICAL: No headaches, memory loss, loss of strength, numbness, or tremors  SKIN: No itching, burning, rashes, or lesions   MUSCULOSKELETAL: No joint pain or swelling; No muscle, back pain  PSYCHIATRIC: No depression, anxiety, mood swings, or difficulty sleeping  HEME/LYMPH: No easy bruising, or bleeding gums

## 2025-05-03 NOTE — H&P ADULT - PROBLEM SELECTOR PLAN 7
- Cr 2.61, last known range was 1.78-1.98 in 2020  - Likely that this is progression from CKD3 back then and not NICOLE  - Takes Farxiga 10mg daily, hold while inpatient  - Continue to monitor Cr and urine output  - Avoid nephrotoxic medications  - Dose medications according to GFR - On Mounjaro 7.5mg weekly on Fridays  - CHASITY for now  - F/u A1c

## 2025-05-03 NOTE — ED ADULT NURSE REASSESSMENT NOTE - NS ED NURSE REASSESS COMMENT FT1
Pedroza catheter size 16 Divehi inserted using sterile technique. Procedure and indication explained to pt prior to beginning, and pt tolerated procedure well. Second RN present to confirm sterility. Bedside drainage to gravity. 800cc urine draining into bag.

## 2025-05-03 NOTE — ED PROVIDER NOTE - ATTENDING CONTRIBUTION TO CARE
I have personally performed a face to face medical and diagnostic evaluation of the patient. I have discussed with and reviewed the Resident's and/or ACP's and/or Medical/PA/NP student's note and agree with the History, ROS, Physical Exam and MDM unless otherwise indicated. A brief summary of my personal evaluation and impression can be found below.     69-year-old male past medical history hypertension, hyperlipidemia, A-fib on Eliquis presents emergency department with 1 week of constipation, patient endorses hard stools over the last few days now having liquid bowel movement when he urinates.  Dysuria or hematuria.  Denies fever or chills.  No abdominal pain.  Prior surgical history of prostatectomy and hernia repair.      Physical exam shows abdomen soft and nontender.  No pitting edema lower extremities, distal pulses 2+ bilaterally.  Neuro intact.  Regular rate and rhythm, lungs clear to auscultation.      Rectal exam performed by Dr. Bowman, chaperoned by myself.   Patient unable to tolerate rectal exam due to pain, no induration or fluctuance or lesions noted in perianal area.    Given hx and physical, ddx includes but is not limited to stercoral colitis, Urinary retention, constipation, bowel obstruction, metabolic derangement, UTI.  Plan for labs, CT, UA, bladder scan, reassess.

## 2025-05-03 NOTE — H&P ADULT - NSHPLABSRESULTS_GEN_ALL_CORE
13.7   10.06 )-----------( 162      ( 03 May 2025 18:12 )             41.9     05-    141  |  110[H]  |  49[H]  ----------------------------<  95  4.6   |  17[L]  |  2.61[H]    Ca    9.3      03 May 2025 18:12    TPro  6.8  /  Alb  3.8  /  TBili  0.4  /  DBili  x   /  AST  21  /  ALT  19  /  AlkPhos  72  05-03          LIVER FUNCTIONS - ( 03 May 2025 18:12 )  Alb: 3.8 g/dL / Pro: 6.8 g/dL / ALK PHOS: 72 U/L / ALT: 19 U/L / AST: 21 U/L / GGT: x             Urinalysis Basic - ( 03 May 2025 19:08 )    Color: Yellow / Appearance: Clear / S.015 / pH: x  Gluc: x / Ketone: Negative mg/dL  / Bili: Negative / Urobili: 0.2 mg/dL   Blood: x / Protein: 300 mg/dL / Nitrite: Negative   Leuk Esterase: Negative / RBC: 13 /HPF / WBC 0 /HPF   Sq Epi: x / Non Sq Epi: 0 /HPF / Bacteria: Negative /HPF

## 2025-05-03 NOTE — H&P ADULT - PROBLEM SELECTOR PLAN 1
- CT A/P showing stool-filled rectum distended to 7.7 x 6.1 cm in diameter, mild rectal thickening, Overall mild to moderate colonic stool burden elsewhere in the colon  - Received mineral oil enema in ED, patient still unable to defecate after; also tried fleet enema at home  - Didn't tolerate manual disimpaction in ED due to rectal pain  - Will give magnesium citrate, if that is also not effective, may need GI consult to remove stool via flex sigmoidoscopy - CT A/P showing stool-filled rectum distended to 7.7 x 6.1 cm in diameter, mild rectal thickening, Overall mild to moderate colonic stool burden elsewhere in the colon  - Received mineral oil enema in ED, patient still unable to defecate after; also tried fleet enema at home  - Didn't tolerate manual disimpaction in ED due to rectal pain  - Will give magnesium citrate and another mineral oil enema, if that is also not effective, may need GI consult to remove stool via flex sigmoidoscopy

## 2025-05-03 NOTE — ED PROVIDER NOTE - PROGRESS NOTE DETAILS
Sandy Bowman DO (PGY-1): pending offical CT read but large stool burden based on wet read. Severe tendnerness on rectal exam. mild erythema surrounding anus but no areas of induration/ fluctuance. not able to disimpact any stool. Will re-eval after CT read and likely offer enema. Kenyatta Davila MD: patient stable, bladder scan 500cc e/o retention will order for dangelo. CT w/ stercoral proctitis will treat w/ cipro /flagyl. Large volume stool burden with minimal overflow stool passage. Unsuccessful enema, manual disimpaction. Plan for admit for admission. Discussed concerns w/ patient and spouse who are amenable w/ plan.

## 2025-05-03 NOTE — ED PROVIDER NOTE - PHYSICAL EXAMINATION
GENERAL: Awake, alert, NAD  HEENT: NC/AT, moist mucous membranes,   LUNGS: CTAB, no wheezes or crackles   CARDIAC: RRR, no m/r/g  ABDOMEN: suprapubic ttp  BACK: no CVA tenderness  EXT: No edema, no calf tenderness,   NEURO: A&Ox3. Moving all extremities.  SKIN: Warm and dry. No rash.  PSYCH: Normal affect.

## 2025-05-03 NOTE — ED PROVIDER NOTE - CLINICAL SUMMARY MEDICAL DECISION MAKING FREE TEXT BOX
69-year-old male history of hypertension, hyperlipidemia, A-fib on Eliquis  Presenting to the ED with 1 week of constipation.  Endorses last large-volume bowel movement was on Tuesday.  Endorses intermittent small stools since.  Has he been using Colace daily.  Used Fleet enema today.  Endorses mild urinary retention.  Lower quadrant abdominal pain.  Endorses small amounts of watery defecation when passing gas.  Denies fever, chills, chest pain, shortness of breath, dysuria, hematuria.  69-year-old male well-appearing no acute distress.  Suprapubic area tender to palpation.  History of CKD will obtain CT abdomen pelvis Noncon to evaluate for SBO. will obtain blader scan to eval for retention. 69-year-old male history of hypertension, hyperlipidemia, A-fib on Eliquis  Presenting to the ED with 1 week of constipation.  Endorses last large-volume bowel movement was on Tuesday.  Endorses intermittent small stools since.  Has he been using Colace daily.  Used Fleet enema today.  Endorses mild urinary retention.  Lower quadrant abdominal pain.  Endorses small amounts of watery defecation when passing gas.  Denies fever, chills, chest pain, shortness of breath, dysuria, hematuria.  69-year-old male well-appearing no acute distress.  Suprapubic area tender to palpation.  History of CKD will obtain CT abdomen pelvis Noncon to evaluate for SBO. will obtain blader scan to eval for retention.  Admit for mgmt of constipation, hx CKD retaining on exam 2/2 constipation. Will place dangelo, start abx for stercoral proctitis, admit to medicine for mgmt. 69-year-old male history of hypertension, hyperlipidemia, A-fib on Eliquis  Presenting to the ED with 1 week of constipation.  Endorses last large-volume bowel movement was on Tuesday.  Endorses intermittent small stools since.  Has he been using Colace daily.  Used Fleet enema today.  Endorses mild urinary retention.  Lower quadrant abdominal pain.  Endorses small amounts of watery defecation when passing gas.  Denies fever, chills, chest pain, shortness of breath, dysuria, hematuria.  69-year-old male well-appearing no acute distress.  Suprapubic area tender to palpation.  History of CKD will obtain CT abdomen pelvis Noncon to evaluate for SBO. will obtain blader scan to eval for retention.  Admit for mgmt of constipation, hx CKD retaining on exam 2/2 constipation. Will place dangelo, admit to medicine for mgmt. No CDU beds available at this time.

## 2025-05-03 NOTE — H&P ADULT - PROBLEM SELECTOR PLAN 8
DVT PPx: Eliquis/Xarelto - Cr 2.61, last known range was 1.78-1.98 in 2020  - Likely that this is progression from CKD3 back then and not NICOLE  - Takes Farxiga 10mg daily, hold while inpatient  - Continue to monitor Cr and urine output  - Avoid nephrotoxic medications  - Dose medications according to GFR - Cr 2.61, last known range was 1.78-1.98 in 2020  - Likely that this is progression from CKD3 back then and not NICOLE  - Takes Farxiga 10mg daily, hold while inpatient  - C/w sodium bicarbonate 1300mg AM and 1950mg PM  - Continue to monitor Cr and urine output  - Avoid nephrotoxic medications  - Dose medications according to GFR

## 2025-05-04 PROBLEM — Z87.891 PERSONAL HISTORY OF NICOTINE DEPENDENCE: Chronic | Status: ACTIVE | Noted: 2020-03-27

## 2025-05-04 LAB
A1C WITH ESTIMATED AVERAGE GLUCOSE RESULT: 5.6 % — SIGNIFICANT CHANGE UP (ref 4–5.6)
ALBUMIN SERPL ELPH-MCNC: 3.7 G/DL — SIGNIFICANT CHANGE UP (ref 3.3–5)
ALP SERPL-CCNC: 68 U/L — SIGNIFICANT CHANGE UP (ref 40–120)
ALT FLD-CCNC: 15 U/L — SIGNIFICANT CHANGE UP (ref 10–45)
ANION GAP SERPL CALC-SCNC: 13 MMOL/L — SIGNIFICANT CHANGE UP (ref 5–17)
AST SERPL-CCNC: 15 U/L — SIGNIFICANT CHANGE UP (ref 10–40)
BILIRUB SERPL-MCNC: 0.4 MG/DL — SIGNIFICANT CHANGE UP (ref 0.2–1.2)
BUN SERPL-MCNC: 44 MG/DL — HIGH (ref 7–23)
CALCIUM SERPL-MCNC: 9 MG/DL — SIGNIFICANT CHANGE UP (ref 8.4–10.5)
CHLORIDE SERPL-SCNC: 110 MMOL/L — HIGH (ref 96–108)
CO2 SERPL-SCNC: 19 MMOL/L — LOW (ref 22–31)
CREAT SERPL-MCNC: 2.57 MG/DL — HIGH (ref 0.5–1.3)
EGFR: 26 ML/MIN/1.73M2 — LOW
EGFR: 26 ML/MIN/1.73M2 — LOW
ESTIMATED AVERAGE GLUCOSE: 114 MG/DL — SIGNIFICANT CHANGE UP (ref 68–114)
GLUCOSE BLDC GLUCOMTR-MCNC: 100 MG/DL — HIGH (ref 70–99)
GLUCOSE BLDC GLUCOMTR-MCNC: 107 MG/DL — HIGH (ref 70–99)
GLUCOSE BLDC GLUCOMTR-MCNC: 97 MG/DL — SIGNIFICANT CHANGE UP (ref 70–99)
GLUCOSE BLDC GLUCOMTR-MCNC: 99 MG/DL — SIGNIFICANT CHANGE UP (ref 70–99)
GLUCOSE SERPL-MCNC: 87 MG/DL — SIGNIFICANT CHANGE UP (ref 70–99)
HCT VFR BLD CALC: 40.9 % — SIGNIFICANT CHANGE UP (ref 39–50)
HGB BLD-MCNC: 13.6 G/DL — SIGNIFICANT CHANGE UP (ref 13–17)
MCHC RBC-ENTMCNC: 30.1 PG — SIGNIFICANT CHANGE UP (ref 27–34)
MCHC RBC-ENTMCNC: 33.3 G/DL — SIGNIFICANT CHANGE UP (ref 32–36)
MCV RBC AUTO: 90.5 FL — SIGNIFICANT CHANGE UP (ref 80–100)
NRBC BLD AUTO-RTO: 0 /100 WBCS — SIGNIFICANT CHANGE UP (ref 0–0)
PLATELET # BLD AUTO: 145 K/UL — LOW (ref 150–400)
POTASSIUM SERPL-MCNC: 3.9 MMOL/L — SIGNIFICANT CHANGE UP (ref 3.5–5.3)
POTASSIUM SERPL-SCNC: 3.9 MMOL/L — SIGNIFICANT CHANGE UP (ref 3.5–5.3)
PROT SERPL-MCNC: 6.6 G/DL — SIGNIFICANT CHANGE UP (ref 6–8.3)
RBC # BLD: 4.52 M/UL — SIGNIFICANT CHANGE UP (ref 4.2–5.8)
RBC # FLD: 13.7 % — SIGNIFICANT CHANGE UP (ref 10.3–14.5)
SODIUM SERPL-SCNC: 142 MMOL/L — SIGNIFICANT CHANGE UP (ref 135–145)
WBC # BLD: 9.04 K/UL — SIGNIFICANT CHANGE UP (ref 3.8–10.5)
WBC # FLD AUTO: 9.04 K/UL — SIGNIFICANT CHANGE UP (ref 3.8–10.5)

## 2025-05-04 PROCEDURE — 99232 SBSQ HOSP IP/OBS MODERATE 35: CPT

## 2025-05-04 RX ORDER — MAGNESIUM CITRATE
296 SOLUTION, ORAL ORAL ONCE
Refills: 0 | Status: COMPLETED | OUTPATIENT
Start: 2025-05-04 | End: 2025-05-04

## 2025-05-04 RX ORDER — MINERAL OIL
133 OIL (ML) MISCELLANEOUS ONCE
Refills: 0 | Status: COMPLETED | OUTPATIENT
Start: 2025-05-04 | End: 2025-05-04

## 2025-05-04 RX ADMIN — EZETIMIBE 10 MILLIGRAM(S): 10 TABLET ORAL at 12:06

## 2025-05-04 RX ADMIN — Medication 1950 MILLIGRAM(S): at 21:03

## 2025-05-04 RX ADMIN — ATORVASTATIN CALCIUM 40 MILLIGRAM(S): 80 TABLET, FILM COATED ORAL at 21:04

## 2025-05-04 RX ADMIN — Medication 2000 UNIT(S): at 05:23

## 2025-05-04 RX ADMIN — CARVEDILOL 12.5 MILLIGRAM(S): 3.12 TABLET, FILM COATED ORAL at 05:23

## 2025-05-04 RX ADMIN — LOSARTAN POTASSIUM 100 MILLIGRAM(S): 100 TABLET, FILM COATED ORAL at 05:22

## 2025-05-04 RX ADMIN — Medication 81 MILLIGRAM(S): at 12:06

## 2025-05-04 RX ADMIN — Medication 296 MILLILITER(S): at 05:23

## 2025-05-04 RX ADMIN — Medication 133 MILLILITER(S): at 02:27

## 2025-05-04 RX ADMIN — Medication 1300 MILLIGRAM(S): at 10:23

## 2025-05-04 RX ADMIN — CARVEDILOL 12.5 MILLIGRAM(S): 3.12 TABLET, FILM COATED ORAL at 17:26

## 2025-05-04 NOTE — PATIENT PROFILE ADULT - FALL HARM RISK - RISK INTERVENTIONS
Dear Sharif,    Today you were seen for a medication refill. It is this program's policy that medications only be refilled for 30 days.    If you require another refill of any medication within the next 6 months, you will need to contact your primary care provider or previous prescribing provider.  It is our policy not to refill any controlled substance medications, even if you may have been previously prescribed these medications by another provider. If you develop new side effects, symptoms, or reactions to your medication,  please contact your primary care provider. The visit conducted today was inclusive of education regarding your current medications.      Thank you for connecting with us today on the [employer] service of [facility]. If you have any questions after your visit, please feel free to contact us at [phone].    If you do not have a primary care provider or have any questions about your visit, please call the [employer] RN at [phone].    If you are experiencing a medical emergency, call 911 immediately.   Symptoms that require immediate attention require a visit at Urgent Care ([place]), Immediate Care Center ([place]) or the Emergency Room of a nearby hospital.  If your symptoms worsen or do not improve, please contact your primary care provider to schedule an in-person visit.       If you have any billing questions please call the billing department.    Phone: [phone]. Hours: Mon. - Thu. 7:30 a.m. - 6 p.m. and Friday 7:30 p.m. - 5 p.m.      
Assistance OOB with selected safe patient handling equipment/Communicate Fall Risk and Risk Factors to all staff, patient, and family/Discuss with provider need for PT consult/Monitor gait and stability/Provide patient with walking aids - walker, cane, crutches/Reinforce activity limits and safety measures with patient and family/Visual Cue: Yellow wristband/Bed in lowest position, wheels locked, appropriate side rails in place/Call bell, personal items and telephone in reach/Instruct patient to call for assistance before getting out of bed or chair/Non-slip footwear when patient is out of bed/[place] to call system/Physically safe environment - no spills, clutter or unnecessary equipment/Purposeful Proactive Rounding/Room/bathroom lighting operational, light cord in reach

## 2025-05-04 NOTE — PROGRESS NOTE ADULT - PROBLEM SELECTOR PLAN 1
- CT A/P showing stool-filled rectum distended to 7.7 x 6.1 cm in diameter, mild rectal thickening, Overall mild to moderate colonic stool burden elsewhere in the colon  - Received mineral oil enema in ED, patient still unable to defecate after; also tried fleet enema at home  - Didn't tolerate manual disimpaction in ED due to rectal pain  - Will give magnesium citrate and another mineral oil enema, if that is also not effective, may need GI consult to remove stool via flex sigmoidoscopy

## 2025-05-04 NOTE — PATIENT PROFILE ADULT - NSPRESCRALCAMT_GEN_A_NUR
Phone Number Called: [phone] (home)      Call outcome: Left detailed message for patient. Informed to call back with any additional questions.    Message: 2/06/2025 2ND NO SHOW @ 2ND ST/LVM WITH NO SHOW POLICY PC    
1 or 2

## 2025-05-04 NOTE — CHART NOTE - NSCHARTNOTEFT_GEN_A_CORE
Patient reported using home CPAP for JILL.  Does not have own CPAP mashing with him and does not recall the setting.  Ordered CPAP 5 for now until verified.  f/u with Attending in am    Devora Ignacio NP  Medicine  [id number]

## 2025-05-04 NOTE — PROGRESS NOTE ADULT - PROBLEM SELECTOR PLAN 8
- Cr 2.61, last known range was 1.78-1.98 in 2020  - Likely that this is progression from CKD3 back then and not NICOLE  - Takes Farxiga 10mg daily, hold while inpatient  - C/w sodium bicarbonate 1300mg AM and 1950mg PM  - Continue to monitor Cr and urine output  - Avoid nephrotoxic medications  - Dose medications according to GFR

## 2025-05-04 NOTE — PROGRESS NOTE ADULT - ASSESSMENT
68 y/o male w/ PMHx paroxysmal atrial fibrillation s/p ablation on ASA, HTN, HLD, T2DM, CKD4, and COPD who presents for constipation. CT showing distended stool filled rectum with rectal wall thickening that may represent stercoral colitis. Admitted for management of constipation and stercoral colitis.

## 2025-05-04 NOTE — PROGRESS NOTE ADULT - PROBLEM SELECTOR PLAN 2
- CT showing mild rectal thickening suggestive of stercoral colitis in setting of large stool burden  - S/p Cipro/flagyl  - Monitor off of further abx at the moment, patient afebrile and no leukocytosis, no diverticulitis on CT  - Should resolve once constipation better treated

## 2025-05-04 NOTE — PROGRESS NOTE ADULT - SUBJECTIVE AND OBJECTIVE BOX
Donta Givens DO  Division of Hospital Medicine  Available on [employer] TEAMS    Patient is a 69y old  Male who presents with a chief complaint of Constipation, stercoral colitis (03 May 2025 22:49)      INTERVAL HPI/OVERNIGHT EVENTS: Patient seen and examined at bedside. No overnight events. Tolerating diet. Denies fever, chills, chest pain, shortness of breath, abdominal pain, nausea/vomiting, headache. Endorses constipation.     MEDICATIONS  (STANDING):  aspirin enteric coated 81 milliGRAM(s) Oral daily  atorvastatin 40 milliGRAM(s) Oral at bedtime  carvedilol 12.5 milliGRAM(s) Oral every 12 hours  cholecalciferol 2000 Unit(s) Oral every 24 hours  dextrose 5%. 1000 milliLiter(s) (50 mL/Hr) IV Continuous <Continuous>  dextrose 5%. 1000 milliLiter(s) (100 mL/Hr) IV Continuous <Continuous>  dextrose 50% Injectable 25 Gram(s) IV Push once  dextrose 50% Injectable 12.5 Gram(s) IV Push once  dextrose 50% Injectable 25 Gram(s) IV Push once  ezetimibe 10 milliGRAM(s) Oral daily  glucagon  Injectable 1 milliGRAM(s) IntraMuscular once  insulin lispro (ADMELOG) corrective regimen sliding scale   SubCutaneous three times a day before meals  losartan 100 milliGRAM(s) Oral daily  sodium bicarbonate 1300 milliGRAM(s) Oral <User Schedule>  sodium bicarbonate 1950 milliGRAM(s) Oral at bedtime    MEDICATIONS  (PRN):  acetaminophen     Tablet .. 650 milliGRAM(s) Oral every 6 hours PRN Temp greater or equal to 38C (100.4F), Mild Pain (1 - 3)  dextrose Oral Gel 15 Gram(s) Oral once PRN Blood Glucose LESS THAN 70 milliGRAM(s)/deciliter  melatonin 3 milliGRAM(s) Oral at bedtime PRN Insomnia      Allergies    No Known Allergies    Intolerances        REVIEW OF SYSTEMS:  All other review of systems is negative unless indicated above      Vital Signs Last 24 Hrs  T(C): 36.9 (04 May 2025 11:33), Max: 37.1 (03 May 2025 23:53)  T(F): 98.5 (04 May 2025 11:33), Max: 98.7 (03 May 2025 23:53)  HR: 85 (04 May 2025 14:16) (75 - 85)  BP: 123/77 (04 May 2025 11:33) (123/77 - 161/80)  BP(mean): --  RR: 18 (04 May 2025 11:33) (18 - 19)  SpO2: 96% (04 May 2025 14:16) (95% - 100%)    Parameters below as of 04 May 2025 11:33  Patient On (Oxygen Delivery Method): room air        PHYSICAL EXAM:  GENERAL: NAD  HEENT:  anicteric, moist mucous membranes  CHEST/LUNG:  CTA b/l, no rales, wheezes, or rhonchi  HEART:  RRR, S1, S2  ABDOMEN:  BS+, soft, nontender, nondistended  EXTREMITIES: no edema, cyanosis, or calf tenderness  NERVOUS SYSTEM: answers questions and follows commands appropriately    LABS:                        13.6   9.04  )-----------( 145      ( 04 May 2025 06:25 )             40.9     CBC Full  -  ( 04 May 2025 06:25 )  WBC Count : 9.04 K/uL  Hemoglobin : 13.6 g/dL  Hematocrit : 40.9 %  Platelet Count - Automated : 145 K/uL  Mean Cell Volume : 90.5 fl  Mean Cell Hemoglobin : 30.1 pg  Mean Cell Hemoglobin Concentration : 33.3 g/dL  Auto Neutrophil # : x  Auto Lymphocyte # : x  Auto Monocyte # : x  Auto Eosinophil # : x  Auto Basophil # : x  Auto Neutrophil % : x  Auto Lymphocyte % : x  Auto Monocyte % : x  Auto Eosinophil % : x  Auto Basophil % : x    04 May 2025 06:23    142    |  110    |  44     ----------------------------<  87     3.9     |  19     |  2.57     Ca    9.0        04 May 2025 06:23    TPro  6.6    /  Alb  3.7    /  TBili  0.4    /  DBili  x      /  AST  15     /  ALT  15     /  AlkPhos  68     04 May 2025 06:23      Urinalysis Basic - ( 04 May 2025 06:23 )    Color: x / Appearance: x / SG: x / pH: x  Gluc: 87 mg/dL / Ketone: x  / Bili: x / Urobili: x   Blood: x / Protein: x / Nitrite: x   Leuk Esterase: x / RBC: x / WBC x   Sq Epi: x / Non Sq Epi: x / Bacteria: x      CAPILLARY BLOOD GLUCOSE      POCT Blood Glucose.: 100 mg/dL (04 May 2025 16:56)  POCT Blood Glucose.: 107 mg/dL (04 May 2025 11:48)  POCT Blood Glucose.: 99 mg/dL (04 May 2025 07:57)        Urinalysis with Rflx Culture (collected 05-03-25 @ 19:08)        RADIOLOGY & ADDITIONAL TESTS:    Personally reviewed.     Consultant(s) Notes Reviewed:  [x] YES  [ ] NO

## 2025-05-05 ENCOUNTER — TRANSCRIPTION ENCOUNTER (OUTPATIENT)
Age: 70
End: 2025-05-05

## 2025-05-05 VITALS
HEART RATE: 73 BPM | SYSTOLIC BLOOD PRESSURE: 137 MMHG | DIASTOLIC BLOOD PRESSURE: 87 MMHG | RESPIRATION RATE: 18 BRPM | OXYGEN SATURATION: 97 % | TEMPERATURE: 98 F

## 2025-05-05 LAB
ANION GAP SERPL CALC-SCNC: 13 MMOL/L — SIGNIFICANT CHANGE UP (ref 5–17)
BUN SERPL-MCNC: 38 MG/DL — HIGH (ref 7–23)
CALCIUM SERPL-MCNC: 8.5 MG/DL — SIGNIFICANT CHANGE UP (ref 8.4–10.5)
CHLORIDE SERPL-SCNC: 107 MMOL/L — SIGNIFICANT CHANGE UP (ref 96–108)
CO2 SERPL-SCNC: 19 MMOL/L — LOW (ref 22–31)
CREAT SERPL-MCNC: 2.38 MG/DL — HIGH (ref 0.5–1.3)
EGFR: 28 ML/MIN/1.73M2 — LOW
EGFR: 28 ML/MIN/1.73M2 — LOW
GLUCOSE BLDC GLUCOMTR-MCNC: 121 MG/DL — HIGH (ref 70–99)
GLUCOSE BLDC GLUCOMTR-MCNC: 89 MG/DL — SIGNIFICANT CHANGE UP (ref 70–99)
GLUCOSE SERPL-MCNC: 102 MG/DL — HIGH (ref 70–99)
HCT VFR BLD CALC: 39.8 % — SIGNIFICANT CHANGE UP (ref 39–50)
HGB BLD-MCNC: 13 G/DL — SIGNIFICANT CHANGE UP (ref 13–17)
MCHC RBC-ENTMCNC: 29.7 PG — SIGNIFICANT CHANGE UP (ref 27–34)
MCHC RBC-ENTMCNC: 32.7 G/DL — SIGNIFICANT CHANGE UP (ref 32–36)
MCV RBC AUTO: 91.1 FL — SIGNIFICANT CHANGE UP (ref 80–100)
NRBC BLD AUTO-RTO: 0 /100 WBCS — SIGNIFICANT CHANGE UP (ref 0–0)
PLATELET # BLD AUTO: 134 K/UL — LOW (ref 150–400)
POTASSIUM SERPL-MCNC: 3.7 MMOL/L — SIGNIFICANT CHANGE UP (ref 3.5–5.3)
POTASSIUM SERPL-SCNC: 3.7 MMOL/L — SIGNIFICANT CHANGE UP (ref 3.5–5.3)
RBC # BLD: 4.37 M/UL — SIGNIFICANT CHANGE UP (ref 4.2–5.8)
RBC # FLD: 13.6 % — SIGNIFICANT CHANGE UP (ref 10.3–14.5)
SODIUM SERPL-SCNC: 139 MMOL/L — SIGNIFICANT CHANGE UP (ref 135–145)
WBC # BLD: 6.23 K/UL — SIGNIFICANT CHANGE UP (ref 3.8–10.5)
WBC # FLD AUTO: 6.23 K/UL — SIGNIFICANT CHANGE UP (ref 3.8–10.5)

## 2025-05-05 PROCEDURE — 85027 COMPLETE CBC AUTOMATED: CPT

## 2025-05-05 PROCEDURE — 74176 CT ABD & PELVIS W/O CONTRAST: CPT | Mod: MC

## 2025-05-05 PROCEDURE — 80053 COMPREHEN METABOLIC PANEL: CPT

## 2025-05-05 PROCEDURE — 36415 COLL VENOUS BLD VENIPUNCTURE: CPT

## 2025-05-05 PROCEDURE — 99239 HOSP IP/OBS DSCHRG MGMT >30: CPT

## 2025-05-05 PROCEDURE — 96374 THER/PROPH/DIAG INJ IV PUSH: CPT

## 2025-05-05 PROCEDURE — 81001 URINALYSIS AUTO W/SCOPE: CPT

## 2025-05-05 PROCEDURE — 80048 BASIC METABOLIC PNL TOTAL CA: CPT

## 2025-05-05 PROCEDURE — 82962 GLUCOSE BLOOD TEST: CPT

## 2025-05-05 PROCEDURE — 99285 EMERGENCY DEPT VISIT HI MDM: CPT | Mod: 25

## 2025-05-05 PROCEDURE — 85025 COMPLETE CBC W/AUTO DIFF WBC: CPT

## 2025-05-05 PROCEDURE — 94660 CPAP INITIATION&MGMT: CPT

## 2025-05-05 PROCEDURE — 83036 HEMOGLOBIN GLYCOSYLATED A1C: CPT

## 2025-05-05 RX ORDER — ACETAMINOPHEN 500 MG/5ML
2 LIQUID (ML) ORAL
Qty: 0 | Refills: 0 | DISCHARGE
Start: 2025-05-05

## 2025-05-05 RX ADMIN — EZETIMIBE 10 MILLIGRAM(S): 10 TABLET ORAL at 12:42

## 2025-05-05 RX ADMIN — CARVEDILOL 12.5 MILLIGRAM(S): 3.12 TABLET, FILM COATED ORAL at 05:07

## 2025-05-05 RX ADMIN — Medication 81 MILLIGRAM(S): at 12:42

## 2025-05-05 RX ADMIN — Medication 1300 MILLIGRAM(S): at 09:02

## 2025-05-05 RX ADMIN — LOSARTAN POTASSIUM 100 MILLIGRAM(S): 100 TABLET, FILM COATED ORAL at 05:07

## 2025-05-05 RX ADMIN — Medication 2000 UNIT(S): at 05:07

## 2025-05-05 NOTE — DISCHARGE NOTE NURSING/CASE MANAGEMENT/SOCIAL WORK - FINANCIAL ASSISTANCE
[facility] provides services at a reduced cost to those who are determined to be eligible through [facility]’s financial assistance program. Information regarding [facility]’s financial assistance program can be found by going to [email or web address] or by calling [phone].

## 2025-05-05 NOTE — DISCHARGE NOTE NURSING/CASE MANAGEMENT/SOCIAL WORK - NSDCFUADDAPPT_GEN_ALL_CORE_FT
APPTS ARE READY TO BE MADE: [X] YES    Best Family or Patient Contact (if needed):    Additional Information about above appointments (if needed):    1: Home Pulm 1-3 days   2:   3:     Other comments or requests:

## 2025-05-05 NOTE — DISCHARGE NOTE PROVIDER - NSDCMRMEDTOKEN_GEN_ALL_CORE_FT
acetaminophen 325 mg oral tablet: 2 tab(s) orally every 6 hours As needed Temp greater or equal to 38C (100.4F), Mild Pain (1 - 3)  Anoro Ellipta 62.5 mcg-25 mcg/inh inhalation powder: 1 puff(s) inhaled once a day (at bedtime)  aspirin 81 mg oral delayed release tablet: 1 tab(s) orally once a day  atorvastatin 40 mg oral tablet: 1 tab(s) orally once a day  carvedilol 12.5 mg oral tablet: 1 tab(s) orally 2 times a day  cholecalciferol 50 mcg (2000 intl units) oral tablet: 1 tab(s) orally once a day  ezetimibe 10 mg oral tablet: 1 tab(s) orally once a day  Farxiga 10 mg oral tablet: 1 tab(s) orally once a day  losartan 100 mg oral tablet: 1 tab(s) orally once a day  Mounjaro 7.5 mg/0.5 mL subcutaneous solution: 7.5 milligram(s) subcutaneously once a week on Fridays  sodium bicarbonate: 650 milligram(s) orally 2 tablets in AM and 3 tablets at night

## 2025-05-05 NOTE — DISCHARGE NOTE PROVIDER - NSDCFUADDAPPT_GEN_ALL_CORE_FT
APPTS ARE READY TO BE MADE: [X] YES    Best Family or Patient Contact (if needed):    Additional Information about above appointments (if needed):    1: Home Pulm 1-3 days   2:   3:     Other comments or requests:    APPTS ARE READY TO BE MADE: [X] YES    Best Family or Patient Contact (if needed):    Additional Information about above appointments (if needed):    1: Home Pulm 1-3 days   2:   3:     Other comments or requests:       pulm - Appointment was scheduled in [facility] for 5/6/25 at 1:30pm with MALI Cho as TEB.   APPTS ARE READY TO BE MADE: [X] YES    Best Family or Patient Contact (if needed):    Additional Information about above appointments (if needed):    1: Home Pulm 1-3 days   2:   3:     Other comments or requests:   intmed - Provided patient with provider referral information, however patient prefers to schedule the appointments on their own.     pulm - Appointment was scheduled in [facility] for 5/6/25 at 1:30pm with MALI Cho as TEB.

## 2025-05-05 NOTE — DISCHARGE NOTE PROVIDER - HOSPITAL COURSE
HPI:  This is a 70 y/o male w/ PMHx paroxysmal atrial fibrillation s/p ablation on ASA, HTN, HLD, T2DM, CKD4, and COPD who presents for constipation. He had a large BM on Tuesday, and since then has only had very small stools intermittently. He has been taking Colace at home without any improvement, so he decided to take a fleet enema, which still didn't help. He has started to develop abdominal pain in his left lower quadrant as well. He has noticed some passage of liquid stool when he passes gas, also feels he hasn't been able to urinate properly either. Denies any dietary changes or new medications, no new stressors. Does state that his mounjaro dose was increased from 5 to 7.5 recently, however. Due to these symptoms, he decided to come to the ED for further evaluation.    In the ED, he was afebrile and hemodynamically stable, saturating well on RA. CBC wnl, CMP w/ evidence of CKD4 w/ Cr 2.61. UA w/ moderate blood but negative LE or nitrites. CT A/P revealed a stool-filled rectum distended to 7.7 x 6.1cm in diameter with mild rectal thickening that may represent stercoral colitis. Was given Cipro + Flagyl, as well as a mineral oil enema in the ED. Also needed a dangelo catheter placed due to significant post-void residual after urination.  (03 May 2025 22:49)    Hospital Course:     70 y/o male w/ PMHx paroxysmal atrial fibrillation s/p ablation on ASA, HTN, HLD, T2DM, CKD4, and COPD who presents for constipation. CT showing distended stool filled rectum with rectal wall thickening that may represent stercoral colitis. Admitted for management of constipation and stercoral colitis.     Problem/Plan - 1:  ·  Problem: Constipation.   ·  Plan: - CT A/P showing stool-filled rectum distended to 7.7 x 6.1 cm in diameter, mild rectal thickening, Overall mild to moderate colonic stool burden elsewhere in the colon  - Received mineral oil enema in ED, patient still unable to defecate after; also tried fleet enema at home  - Didn't tolerate manual disimpaction in ED due to rectal pain  - Will give magnesium citrate and another mineral oil enema, patient now having BMs     Problem/Plan - 2:  ·  Problem: Stercoral colitis.   ·  Plan: - CT showing mild rectal thickening suggestive of stercoral colitis in setting of large stool burden  - S/p Cipro/flagyl  - Monitor off of further abx at the moment, patient afebrile and no leukocytosis, no diverticulitis on CT  - Now having BMs     Problem/Plan - 3:  ·  Problem: Urinary retention.   ·  Plan: - Had post-void residual of 520cc  - Dangelo placed in ED, drained 800cc  - Dangelo removed passed TOV     Problem/Plan - 4:  ·  Problem: Paroxysmal atrial fibrillation.   ·  Plan: - S/p ablation  - Was initially on Eliquis, then on Xarelto, now on ASA 81mg daily, continue.     Problem/Plan - 5:  ·  Problem: HTN (hypertension).   ·  Plan: - C/w carvedilol 12.5mg BID  - C/w losartan 100mg daily.     Problem/Plan - 6:  ·  Problem: HLD (hyperlipidemia).   ·  Plan: - C/w atorvastatin 40mg QHS  - C/w ezetimibe 10mg daily.     Problem/Plan - 7:  ·  Problem: T2DM (type 2 diabetes mellitus).   ·  Plan: - On Mounjaro 7.5mg weekly on Fridays  - CHASITY for now  - A1c 5.6.     Problem/Plan - 8:  ·  Problem: Stage 4 chronic kidney disease.   ·  Plan: - Cr 2.61, last known range was 1.78-1.98 in 2020  - Likely that this is progression from CKD3 back then and not NICOLE  - Takes Farxiga 10mg daily, hold while inpatient  - C/w sodium bicarbonate 1300mg AM and 1950mg PM  - Continue to monitor Cr and urine output  - Avoid nephrotoxic medications  - Dose medications according to GFR.     Problem/Plan - 9:  ·  Problem: COPD, mild.   ·  Plan: - Takes Anoro-Ellipta 62.5-25 nightly  - Not on formulary, patient can take own medication.     Problem/Plan - 10:  ·  Problem: Prophylactic measure.   ·  Plan; DVT PPx: Lovenox.    Advanced Directives:   [X] Full code  [ ] DNR  [ ] Hospice    Discharge Diagnoses:  (Admit Diagnosis) Constipation       HPI:  This is a 68 y/o male w/ PMHx paroxysmal atrial fibrillation s/p ablation on ASA, HTN, HLD, T2DM, CKD4, and COPD who presents for constipation. He had a large BM on Tuesday, and since then has only had very small stools intermittently. He has been taking Colace at home without any improvement, so he decided to take a fleet enema, which still didn't help. He has started to develop abdominal pain in his left lower quadrant as well. He has noticed some passage of liquid stool when he passes gas, also feels he hasn't been able to urinate properly either. Denies any dietary changes or new medications, no new stressors. Does state that his mounjaro dose was increased from 5 to 7.5 recently, however. Due to these symptoms, he decided to come to the ED for further evaluation.    In the ED, he was afebrile and hemodynamically stable, saturating well on RA. CBC wnl, CMP w/ evidence of CKD4 w/ Cr 2.61. UA w/ moderate blood but negative LE or nitrites. CT A/P revealed a stool-filled rectum distended to 7.7 x 6.1cm in diameter with mild rectal thickening that may represent stercoral colitis. Was given Cipro + Flagyl, as well as a mineral oil enema in the ED. Also needed a dangelo catheter placed due to significant post-void residual after urination.  (03 May 2025 22:49)    Hospital Course:     68 y/o male w/ PMHx paroxysmal atrial fibrillation s/p ablation on ASA, HTN, HLD, T2DM, CKD4, and COPD who presents for constipation. CT showing distended stool filled rectum with rectal wall thickening that may represent stercoral colitis. Admitted for management of constipation and stercoral colitis.     Problem/Plan - 1:  ·  Problem: Constipation.   ·  Plan: - CT A/P showing stool-filled rectum distended to 7.7 x 6.1 cm in diameter, mild rectal thickening, Overall mild to moderate colonic stool burden elsewhere in the colon  - Received mineral oil enema in ED, patient still unable to defecate after; also tried fleet enema at home  - Didn't tolerate manual disimpaction in ED due to rectal pain  - s/p magnesium citrate and another mineral oil enema with multiple bms.      Problem/Plan - 2:  ·  Problem: Stercoral colitis.   ·  Plan: - CT showing mild rectal thickening suggestive of stercoral colitis in setting of large stool burden  - S/p Cipro/flagyl  - Monitor off of further abx at the moment, patient afebrile and no leukocytosis, no diverticulitis on CT  - Now having BMs  - Provided education on foods to add to diet to avoid constipation (kiwi, prunes)     Problem/Plan - 3:  ·  Problem: Urinary retention.   ·  Plan: - Had post-void residual of 520cc  - Dangelo placed in ED, drained 800cc  - Dangelo removed after bms, passed TOV     Problem/Plan - 4:  ·  Problem: Paroxysmal atrial fibrillation.   ·  Plan: - S/p ablation  - Was initially on Eliquis, then on Xarelto, now on ASA 81mg daily, continue.     Problem/Plan - 5:  ·  Problem: HTN (hypertension).   ·  Plan: - C/w carvedilol 12.5mg BID  - C/w losartan 100mg daily.     Problem/Plan - 6:  ·  Problem: HLD (hyperlipidemia).   ·  Plan: - C/w atorvastatin 40mg QHS  - C/w ezetimibe 10mg daily.     Problem/Plan - 7:  ·  Problem: T2DM (type 2 diabetes mellitus).   ·  Plan: - On Mounjaro 7.5mg weekly on Fridays  - Discussed may have been resolved in current presentation. Advised decrease to 5 but to discuss with prescribing physician     Problem/Plan - 8:  ·  Problem: Stage 4 chronic kidney disease.   ·  Plan: - Cr 2.61, last known range was 1.78-1.98 in 2020  - Likely that this is progression from CKD3 back then and not NICOLE  - Resume Farxiga 10mg on d/c  - C/w sodium bicarbonate 1300mg AM and 1950mg PM     Problem/Plan - 9:  ·  Problem: COPD, mild.   ·  Plan: - continue Anoro-Ellipta 62.5-25 nightly    Advanced Directives:   [X] Full code  [ ] DNR  [ ] Hospice    Discharge Diagnoses:  Constipation  stercoral colitis  CKD4  Urinary Retention   paroxysmal afib

## 2025-05-05 NOTE — DISCHARGE NOTE PROVIDER - NSDCCPCAREPLAN_GEN_ALL_CORE_FT
PRINCIPAL DISCHARGE DIAGNOSIS  Diagnosis: Constipation  Assessment and Plan of Treatment: You had constipation  You were given a bowel regimen  You also had urinary retention.  You had a dangelo catheter placed, which was removed.  You are now urinating on your own.   Follow up with your primary care doctor in 1 week.

## 2025-05-05 NOTE — DISCHARGE NOTE NURSING/CASE MANAGEMENT/SOCIAL WORK - PATIENT PORTAL LINK FT
You can access the FollowMyHealth Patient Portal offered by [facility] by registering at the following website: [email or web address]. By joining [employer]’s FollowMyHealth portal, you will also be able to view your health information using other applications (apps) compatible with our system.

## 2025-05-05 NOTE — DISCHARGE NOTE PROVIDER - NSDCFUSCHEDAPPT_GEN_ALL_CORE_FT
Samina Bryan  [facility]  PULED [address] [place] [address]  Scheduled Appointment: 05/06/2025

## 2025-05-06 ENCOUNTER — APPOINTMENT (OUTPATIENT)
Dept: PULMONOLOGY | Facility: CLINIC | Age: 70
End: 2025-05-06
Payer: MEDICARE

## 2025-05-06 DIAGNOSIS — I48.91 UNSPECIFIED ATRIAL FIBRILLATION: ICD-10-CM

## 2025-05-06 DIAGNOSIS — J44.9 CHRONIC OBSTRUCTIVE PULMONARY DISEASE, UNSPECIFIED: ICD-10-CM

## 2025-05-06 DIAGNOSIS — K52.89 OTHER SPECIFIED NONINFECTIVE GASTROENTERITIS AND COLITIS: ICD-10-CM

## 2025-05-06 DIAGNOSIS — K59.00 CONSTIPATION, UNSPECIFIED: ICD-10-CM

## 2025-05-06 DIAGNOSIS — R33.9 RETENTION OF URINE, UNSPECIFIED: ICD-10-CM

## 2025-05-06 PROCEDURE — 99496 TRANSJ CARE MGMT HIGH F2F 7D: CPT | Mod: 2W

## 2025-05-27 ENCOUNTER — NON-APPOINTMENT (OUTPATIENT)
Age: 70
End: 2025-05-27

## 2025-05-28 ENCOUNTER — APPOINTMENT (OUTPATIENT)
Dept: GASTROENTEROLOGY | Facility: CLINIC | Age: 70
End: 2025-05-28
Payer: MEDICARE

## 2025-05-28 DIAGNOSIS — D12.6 BENIGN NEOPLASM OF COLON, UNSPECIFIED: ICD-10-CM

## 2025-05-28 DIAGNOSIS — D12.2 BENIGN NEOPLASM OF ASCENDING COLON: ICD-10-CM

## 2025-05-28 DIAGNOSIS — K64.8 OTHER HEMORRHOIDS: ICD-10-CM

## 2025-05-28 DIAGNOSIS — Z12.11 ENCOUNTER FOR SCREENING FOR MALIGNANT NEOPLASM OF COLON: ICD-10-CM

## 2025-05-28 DIAGNOSIS — K63.5 POLYP OF COLON: ICD-10-CM

## 2025-05-28 DIAGNOSIS — K57.30 DIVERTICULOSIS OF LARGE INTESTINE W/OUT PERFORATION OR ABSCESS W/OUT BLEEDING: ICD-10-CM

## 2025-05-28 DIAGNOSIS — Z87.19 PERSONAL HISTORY OF OTHER DISEASES OF THE DIGESTIVE SYSTEM: ICD-10-CM

## 2025-05-28 PROCEDURE — 99204 OFFICE O/P NEW MOD 45 MIN: CPT

## 2025-05-28 RX ORDER — TIRZEPATIDE 7.5 MG/.5ML
7.5 INJECTION, SOLUTION SUBCUTANEOUS
Refills: 0 | Status: ACTIVE | COMMUNITY

## 2025-05-28 RX ORDER — SODIUM BICARBONATE 650 MG/1
650 TABLET ORAL
Refills: 0 | Status: ACTIVE | COMMUNITY

## 2025-05-28 RX ORDER — UMECLIDINIUM BROMIDE AND VILANTEROL TRIFENATATE 62.5; 25 UG/1; UG/1
62.5-25 POWDER RESPIRATORY (INHALATION)
Refills: 0 | Status: ACTIVE | COMMUNITY

## 2025-05-28 RX ORDER — MULTIVIT-MIN/FOLIC/VIT K/LYCOP 400-300MCG
50 MCG TABLET ORAL
Refills: 0 | Status: ACTIVE | COMMUNITY

## 2025-05-28 RX ORDER — DAPAGLIFLOZIN 10 MG/1
10 TABLET, FILM COATED ORAL
Refills: 0 | Status: ACTIVE | COMMUNITY

## 2025-05-28 RX ORDER — CARVEDILOL 12.5 MG/1
12.5 TABLET, FILM COATED ORAL
Refills: 0 | Status: ACTIVE | COMMUNITY

## 2025-05-28 RX ORDER — POLYETHYLENE GLYCOL 3350, SODIUM CHLORIDE, SODIUM BICARBONATE AND POTASSIUM CHLORIDE WITH LEMON FLAVOR 420; 11.2; 5.72; 1.48 G/4L; G/4L; G/4L; G/4L
420 POWDER, FOR SOLUTION ORAL
Qty: 1 | Refills: 0 | Status: ACTIVE | COMMUNITY
Start: 2025-05-28 | End: 1900-01-01

## 2025-06-23 NOTE — H&P ADULT - NSHPSOCIALHISTORY_GEN_ALL_CORE
Pt did not want to be triaged at this time. Will go to  due to no appt on Thursday or Friday.     Lives with spouse  former smoker  denies Etoh/Illicit drug use   Lives with spouse  former smoker ; quit 20 months ago  denies Etoh/Illicit drug use

## 2025-07-15 ENCOUNTER — OUTPATIENT (OUTPATIENT)
Dept: OUTPATIENT SERVICES | Facility: HOSPITAL | Age: 70
LOS: 1 days | End: 2025-07-15
Payer: MEDICARE

## 2025-07-15 VITALS
HEIGHT: 70 IN | RESPIRATION RATE: 18 BRPM | WEIGHT: 256.84 LBS | TEMPERATURE: 98 F | SYSTOLIC BLOOD PRESSURE: 140 MMHG | HEART RATE: 72 BPM | DIASTOLIC BLOOD PRESSURE: 90 MMHG | OXYGEN SATURATION: 95 %

## 2025-07-15 DIAGNOSIS — Z01.818 ENCOUNTER FOR OTHER PREPROCEDURAL EXAMINATION: ICD-10-CM

## 2025-07-15 DIAGNOSIS — E11.9 TYPE 2 DIABETES MELLITUS WITHOUT COMPLICATIONS: ICD-10-CM

## 2025-07-15 DIAGNOSIS — K59.09 OTHER CONSTIPATION: ICD-10-CM

## 2025-07-15 DIAGNOSIS — K57.30 DIVERTICULOSIS OF LARGE INTESTINE WITHOUT PERFORATION OR ABSCESS WITHOUT BLEEDING: ICD-10-CM

## 2025-07-15 DIAGNOSIS — Z87.19 PERSONAL HISTORY OF OTHER DISEASES OF THE DIGESTIVE SYSTEM: ICD-10-CM

## 2025-07-15 DIAGNOSIS — E78.5 HYPERLIPIDEMIA, UNSPECIFIED: ICD-10-CM

## 2025-07-15 DIAGNOSIS — Z98.890 OTHER SPECIFIED POSTPROCEDURAL STATES: Chronic | ICD-10-CM

## 2025-07-15 DIAGNOSIS — Z12.11 ENCOUNTER FOR SCREENING FOR MALIGNANT NEOPLASM OF COLON: ICD-10-CM

## 2025-07-15 DIAGNOSIS — I48.91 UNSPECIFIED ATRIAL FIBRILLATION: ICD-10-CM

## 2025-07-15 DIAGNOSIS — K64.8 OTHER HEMORRHOIDS: ICD-10-CM

## 2025-07-15 DIAGNOSIS — I10 ESSENTIAL (PRIMARY) HYPERTENSION: ICD-10-CM

## 2025-07-15 DIAGNOSIS — Z12.12 ENCOUNTER FOR SCREENING FOR MALIGNANT NEOPLASM OF RECTUM: ICD-10-CM

## 2025-07-15 LAB
ANION GAP SERPL CALC-SCNC: 8 MMOL/L — SIGNIFICANT CHANGE UP (ref 5–17)
BASOPHILS # BLD AUTO: 0.04 K/UL — SIGNIFICANT CHANGE UP (ref 0–0.2)
BASOPHILS NFR BLD AUTO: 0.6 % — SIGNIFICANT CHANGE UP (ref 0–2)
BUN SERPL-MCNC: 38 MG/DL — HIGH (ref 7–23)
CALCIUM SERPL-MCNC: 8.7 MG/DL — SIGNIFICANT CHANGE UP (ref 8.5–10.1)
CHLORIDE SERPL-SCNC: 115 MMOL/L — HIGH (ref 96–108)
CO2 SERPL-SCNC: 19 MMOL/L — LOW (ref 22–31)
CREAT SERPL-MCNC: 2.46 MG/DL — HIGH (ref 0.5–1.3)
EGFR: 27 ML/MIN/1.73M2 — LOW
EGFR: 27 ML/MIN/1.73M2 — LOW
EOSINOPHIL # BLD AUTO: 0.18 K/UL — SIGNIFICANT CHANGE UP (ref 0–0.5)
EOSINOPHIL NFR BLD AUTO: 2.7 % — SIGNIFICANT CHANGE UP (ref 0–6)
GLUCOSE SERPL-MCNC: 98 MG/DL — SIGNIFICANT CHANGE UP (ref 70–99)
HCT VFR BLD CALC: 39.5 % — SIGNIFICANT CHANGE UP (ref 39–50)
HGB BLD-MCNC: 13.1 G/DL — SIGNIFICANT CHANGE UP (ref 13–17)
IMM GRANULOCYTES NFR BLD AUTO: 0.2 % — SIGNIFICANT CHANGE UP (ref 0–0.9)
LYMPHOCYTES # BLD AUTO: 1.35 K/UL — SIGNIFICANT CHANGE UP (ref 1–3.3)
LYMPHOCYTES # BLD AUTO: 20.3 % — SIGNIFICANT CHANGE UP (ref 13–44)
MCHC RBC-ENTMCNC: 29.6 PG — SIGNIFICANT CHANGE UP (ref 27–34)
MCHC RBC-ENTMCNC: 33.2 G/DL — SIGNIFICANT CHANGE UP (ref 32–36)
MCV RBC AUTO: 89.4 FL — SIGNIFICANT CHANGE UP (ref 80–100)
MONOCYTES # BLD AUTO: 0.52 K/UL — SIGNIFICANT CHANGE UP (ref 0–0.9)
MONOCYTES NFR BLD AUTO: 7.8 % — SIGNIFICANT CHANGE UP (ref 2–14)
NEUTROPHILS # BLD AUTO: 4.56 K/UL — SIGNIFICANT CHANGE UP (ref 1.8–7.4)
NEUTROPHILS NFR BLD AUTO: 68.4 % — SIGNIFICANT CHANGE UP (ref 43–77)
NRBC BLD AUTO-RTO: 0 /100 WBCS — SIGNIFICANT CHANGE UP (ref 0–0)
PLATELET # BLD AUTO: 138 K/UL — LOW (ref 150–400)
POTASSIUM SERPL-MCNC: 4 MMOL/L — SIGNIFICANT CHANGE UP (ref 3.5–5.3)
POTASSIUM SERPL-SCNC: 4 MMOL/L — SIGNIFICANT CHANGE UP (ref 3.5–5.3)
RBC # BLD: 4.42 M/UL — SIGNIFICANT CHANGE UP (ref 4.2–5.8)
RBC # FLD: 14.1 % — SIGNIFICANT CHANGE UP (ref 10.3–14.5)
SODIUM SERPL-SCNC: 142 MMOL/L — SIGNIFICANT CHANGE UP (ref 135–145)
WBC # BLD: 6.66 K/UL — SIGNIFICANT CHANGE UP (ref 3.8–10.5)
WBC # FLD AUTO: 6.66 K/UL — SIGNIFICANT CHANGE UP (ref 3.8–10.5)

## 2025-07-15 PROCEDURE — 93010 ELECTROCARDIOGRAM REPORT: CPT

## 2025-07-15 NOTE — H&P PST ADULT - NSICDXPASTMEDICALHX_GEN_ALL_CORE_FT
PAST MEDICAL HISTORY:  Afib     Dyslipidemia     Former smoker     Hypertension     Prostate cancer

## 2025-07-15 NOTE — H&P PST ADULT - HISTORY OF PRESENT ILLNESS
This is a 68 y/o male w/ PMHx paroxysmal atrial fibrillation s/p ablation  in 2022 on ASA, HTN, HLD, T2DM, CKD4, and COPD - scheduled for colonoscopy

## 2025-07-15 NOTE — H&P PST ADULT - PROBLEM SELECTOR PLAN 6
Preop instructions provided including NPO status. Hibiclens wash for infection control. Patient aware to stop NSAID, OTC herbals  for 7-10 days, needs to be accompanied  by adult upon discharge.  Patient verbalized understanding  clear liquids day prior  and colon prep   c/c  anesthesiologist to review pst labs, ekg, medical clearances and optimization for surgery

## 2025-07-15 NOTE — H&P PST ADULT - LIVES WITH, PROFILE
[facility]  [address] [place] [address]  Phone: [phone]  Fax: [phone]  Also available via [employer]     PLACE OF SERVICE:  [facility] [address] [place] [address]    SKILLED VISIT    Chief Complaint:   Chief Complaint   Patient presents with    Follow-up         HPI : Karson Rodriguez is a 83 y.o. male here for follow up.    Previous history prior to admission:  Karson Rodriguez is a 83 y.o. male with history of dementia, atrial fibrillation, recent pulmonary embolism on apixaban, hypertension was brought to ED for evaluation after patient was found to have passed out in his wheelchair.  911 was called.  During evaluation his blood pressure was found to be very low around 80/60.  He was given IV fluid bolus. In the ED patient received ceftriaxone and Flomax he also received normal saline bolus and his condition improved.  He was admitted to hospital.  Repeat CT of the chest showed persistent pulmonary embolism from last week with no worsening.  2D echo showed normal ejection fraction with aortic sclerosis and mild stenosis of aortic valve.  Patient received IV resuscitation.  Evaluated by cardiology and recommended to continue anticoagulation.  Also consider midodrine patient is orthostatic.  Per cardiology no need for pacemaker.  He was recently taken off ACE inhibitor's and beta-blockers due to orthostasis.  Before discharge patient was checked and not orthostatic.  Blood pressure was pretty running on the high side.  He was seen by PT OT who recommended skilled rehab or SNF.  He is admitted to [facility] for skilled rehab    Current visit:  Patient is sitting up in wheelchair today he is awake alert oriented x 2.  He is interactive and engaged with provider.  He does tell provider to go on and not worry about him and do her job.  Provider reviewed vital signs from facility blood pressure has been ranging 120-150  spouse

## 2025-07-15 NOTE — H&P PST ADULT - NSICDXPASTSURGICALHX_GEN_ALL_CORE_FT
PAST SURGICAL HISTORY:  H/O bilateral cataract extraction     H/O cardiac radiofrequency ablation     Parotid gland enlargement s/p removed 22 years ago    S/P eye surgery right eye retina    S/P hernia repair     S/P prostatectomy

## 2025-07-15 NOTE — H&P PST ADULT - ASSESSMENT
diverticulosis, hemorrhoid  CAPRINI SCORE    AGE RELATED RISK FACTORS                                                             [ ] Age 41-60 years                                            (1 Point)  [x ] Age: 61-74 years                                           (2 Points)                 [ ] Age= 75 years                                                (3 Points)             DISEASE RELATED RISK FACTORS                                                       [ ] Edema in the lower extremities                 (1 Point)                     [ ] Varicose veins                                               (1 Point)                                 [ x] BMI > 25 Kg/m2                                            (1 Point)                                  [ ] Serious infection (ie PNA)                            (1 Point)                     [ ] Lung disease ( COPD, Emphysema)            (1 Point)                                                                          [ ] Acute myocardial infarction                         (1 Point)                  [ ] Congestive heart failure (in the previous month)  (1 Point)         [ ] Inflammatory bowel disease                            (1 Point)                  [ ] Central venous access, PICC or Port               (2 points)       (within the last month)                                                                [ ] Stroke (in the previous month)                        (5 Points)    [ ] Previous or present malignancy                       (2 points)                                                                                                                                                         HEMATOLOGY RELATED FACTORS                                                         [ ] Prior episodes of VTE                                     (3 Points)                     [ ] Positive family history for VTE                      (3 Points)                  [ ] Prothrombin 33416 A                                     (3 Points)                     [ ] Factor V Leiden                                                (3 Points)                        [ ] Lupus anticoagulants                                      (3 Points)                                                           [ ] Anticardiolipin antibodies                              (3 Points)                                                       [ ] High homocysteine in the blood                   (3 Points)                                             [ ] Other congenital or acquired thrombophilia      (3 Points)                                                [ ] Heparin induced thrombocytopenia                  (3 Points)                                        MOBILITY RELATED FACTORS  [ ] Bed rest                                                         (1 Point)  [ ] Plaster cast                                                    (2 points)  [ ] Bed bound for more than 72 hours           (2 Points)    GENDER SPECIFIC FACTORS  [ ] Pregnancy or had a baby within the last month   (1 Point)  [ ] Post-partum < 6 weeks                                   (1 Point)  [ ] Hormonal therapy  or oral contraception   (1 Point)  [ ] History of pregnancy complications              (1 point)  [ ] Unexplained or recurrent              (1 Point)    OTHER RISK FACTORS                                           (1 Point)  [ ] BMI >40, smoking, diabetes requiring insulin, chemotherapy  blood transfusions and length of surgery over 2 hours    SURGERY RELATED RISK FACTORS  [ ]  Section within the last month     (1 Point)  [x ] Minor surgery                                                  (1 Point)  [ ] Arthroscopic surgery                                       (2 Points)  [ ] Planned major surgery lasting more            (2 Points)      than 45 minutes     [ ] Elective hip or knee joint replacement       (5 points)       surgery                                                TRAUMA RELATED RISK FACTORS  [ ] Fracture of the hip, pelvis, or leg                       (5 Points)  [ ] Spinal cord injury resulting in paralysis             (5 points)       (in the previous month)    [ ] Paralysis  (less than 1 month)                             (5 Points)  [ ] Multiple Trauma within 1 month                        (5 Points)    Total Score [ 4       ]    Caprini Score 0-2: Low Risk, NO VTE prophylaxis required for most patients, encourage ambulation  Caprini Score 3-6: Moderate Risk , pharmacologic VTE prophylaxis is indicated for most patients (in the absence of contraindications)  Caprini Score Greater than or =7: High risk, pharmocologic VTE prophylaxis indicated for most patients (in the absence of contraindications)

## 2025-07-25 ENCOUNTER — RESULT REVIEW (OUTPATIENT)
Age: 70
End: 2025-07-25

## 2025-07-25 ENCOUNTER — OUTPATIENT (OUTPATIENT)
Dept: OUTPATIENT SERVICES | Facility: HOSPITAL | Age: 70
LOS: 1 days | End: 2025-07-25
Payer: MEDICARE

## 2025-07-25 ENCOUNTER — APPOINTMENT (OUTPATIENT)
Dept: GASTROENTEROLOGY | Facility: HOSPITAL | Age: 70
End: 2025-07-25
Payer: MEDICARE

## 2025-07-25 ENCOUNTER — TRANSCRIPTION ENCOUNTER (OUTPATIENT)
Age: 70
End: 2025-07-25

## 2025-07-25 VITALS
SYSTOLIC BLOOD PRESSURE: 121 MMHG | DIASTOLIC BLOOD PRESSURE: 74 MMHG | OXYGEN SATURATION: 97 % | RESPIRATION RATE: 17 BRPM | TEMPERATURE: 98 F | HEART RATE: 72 BPM

## 2025-07-25 VITALS
OXYGEN SATURATION: 97 % | HEART RATE: 77 BPM | RESPIRATION RATE: 14 BRPM | SYSTOLIC BLOOD PRESSURE: 142 MMHG | DIASTOLIC BLOOD PRESSURE: 78 MMHG | WEIGHT: 255.07 LBS | HEIGHT: 70 IN | TEMPERATURE: 98 F

## 2025-07-25 DIAGNOSIS — K59.09 OTHER CONSTIPATION: ICD-10-CM

## 2025-07-25 DIAGNOSIS — Z12.12 ENCOUNTER FOR SCREENING FOR MALIGNANT NEOPLASM OF RECTUM: ICD-10-CM

## 2025-07-25 DIAGNOSIS — K57.30 DIVERTICULOSIS OF LARGE INTESTINE WITHOUT PERFORATION OR ABSCESS WITHOUT BLEEDING: ICD-10-CM

## 2025-07-25 DIAGNOSIS — Z98.890 OTHER SPECIFIED POSTPROCEDURAL STATES: Chronic | ICD-10-CM

## 2025-07-25 DIAGNOSIS — Z12.11 ENCOUNTER FOR SCREENING FOR MALIGNANT NEOPLASM OF COLON: ICD-10-CM

## 2025-07-25 DIAGNOSIS — K64.8 OTHER HEMORRHOIDS: ICD-10-CM

## 2025-07-25 LAB — GLUCOSE BLDC GLUCOMTR-MCNC: 83 MG/DL — SIGNIFICANT CHANGE UP (ref 70–99)

## 2025-07-25 PROCEDURE — 88305 TISSUE EXAM BY PATHOLOGIST: CPT | Mod: 26

## 2025-07-25 PROCEDURE — 45380 COLONOSCOPY AND BIOPSY: CPT | Mod: PT

## 2025-07-25 DEVICE — RESOLUTION CLIP HEMOSTATIC DEVICE: Type: IMPLANTABLE DEVICE | Status: FUNCTIONAL

## 2025-07-25 DEVICE — PROBE FIAPC DIA 2.3MM/7FR LNTH 220CM/7.2FT: Type: IMPLANTABLE DEVICE | Status: FUNCTIONAL

## 2025-07-25 RX ORDER — DAPAGLIFLOZIN 5 MG/1
0 TABLET, FILM COATED ORAL
Refills: 0 | DISCHARGE

## 2025-07-28 LAB — SURGICAL PATHOLOGY STUDY: SIGNIFICANT CHANGE UP

## 2025-07-30 DIAGNOSIS — I48.0 PAROXYSMAL ATRIAL FIBRILLATION: ICD-10-CM

## 2025-07-30 DIAGNOSIS — D12.3 BENIGN NEOPLASM OF TRANSVERSE COLON: ICD-10-CM

## 2025-07-30 DIAGNOSIS — E78.5 HYPERLIPIDEMIA, UNSPECIFIED: ICD-10-CM

## 2025-07-30 DIAGNOSIS — I12.9 HYPERTENSIVE CHRONIC KIDNEY DISEASE WITH STAGE 1 THROUGH STAGE 4 CHRONIC KIDNEY DISEASE, OR UNSPECIFIED CHRONIC KIDNEY DISEASE: ICD-10-CM

## 2025-07-30 DIAGNOSIS — K64.8 OTHER HEMORRHOIDS: ICD-10-CM

## 2025-07-30 DIAGNOSIS — K57.30 DIVERTICULOSIS OF LARGE INTESTINE WITHOUT PERFORATION OR ABSCESS WITHOUT BLEEDING: ICD-10-CM

## 2025-07-30 DIAGNOSIS — N18.4 CHRONIC KIDNEY DISEASE, STAGE 4 (SEVERE): ICD-10-CM

## 2025-07-30 DIAGNOSIS — Z79.85 LONG-TERM (CURRENT) USE OF INJECTABLE NON-INSULIN ANTIDIABETIC DRUGS: ICD-10-CM

## 2025-07-30 DIAGNOSIS — Z86.0100 PERSONAL HISTORY OF COLON POLYPS, UNSPECIFIED: ICD-10-CM

## 2025-07-30 DIAGNOSIS — D12.4 BENIGN NEOPLASM OF DESCENDING COLON: ICD-10-CM

## 2025-07-30 DIAGNOSIS — Z79.84 LONG TERM (CURRENT) USE OF ORAL HYPOGLYCEMIC DRUGS: ICD-10-CM

## 2025-07-30 DIAGNOSIS — J44.9 CHRONIC OBSTRUCTIVE PULMONARY DISEASE, UNSPECIFIED: ICD-10-CM

## 2025-07-30 DIAGNOSIS — Z79.82 LONG TERM (CURRENT) USE OF ASPIRIN: ICD-10-CM

## 2025-07-30 DIAGNOSIS — E11.22 TYPE 2 DIABETES MELLITUS WITH DIABETIC CHRONIC KIDNEY DISEASE: ICD-10-CM

## 2025-07-30 DIAGNOSIS — Z87.891 PERSONAL HISTORY OF NICOTINE DEPENDENCE: ICD-10-CM

## 2025-07-30 DIAGNOSIS — Z12.11 ENCOUNTER FOR SCREENING FOR MALIGNANT NEOPLASM OF COLON: ICD-10-CM

## 2025-08-05 PROBLEM — R73.03 PREDIABETES: Chronic | Status: ACTIVE | Noted: 2025-07-25

## 2025-08-05 PROBLEM — I48.91 UNSPECIFIED ATRIAL FIBRILLATION: Chronic | Status: ACTIVE | Noted: 2025-07-15

## 2025-09-03 ENCOUNTER — APPOINTMENT (OUTPATIENT)
Dept: GASTROENTEROLOGY | Facility: CLINIC | Age: 70
End: 2025-09-03
Payer: MEDICARE

## 2025-09-03 VITALS
BODY MASS INDEX: 35.79 KG/M2 | SYSTOLIC BLOOD PRESSURE: 122 MMHG | OXYGEN SATURATION: 94 % | RESPIRATION RATE: 17 BRPM | DIASTOLIC BLOOD PRESSURE: 77 MMHG | TEMPERATURE: 98.5 F | HEART RATE: 76 BPM | WEIGHT: 250 LBS | HEIGHT: 70 IN

## 2025-09-03 DIAGNOSIS — D12.2 BENIGN NEOPLASM OF ASCENDING COLON: ICD-10-CM

## 2025-09-03 DIAGNOSIS — K52.89 OTHER SPECIFIED NONINFECTIVE GASTROENTERITIS AND COLITIS: ICD-10-CM

## 2025-09-03 DIAGNOSIS — K57.30 DIVERTICULOSIS OF LARGE INTESTINE W/OUT PERFORATION OR ABSCESS W/OUT BLEEDING: ICD-10-CM

## 2025-09-03 DIAGNOSIS — Z87.19 PERSONAL HISTORY OF OTHER DISEASES OF THE DIGESTIVE SYSTEM: ICD-10-CM

## 2025-09-03 DIAGNOSIS — K59.00 CONSTIPATION, UNSPECIFIED: ICD-10-CM

## 2025-09-03 DIAGNOSIS — J44.9 CHRONIC OBSTRUCTIVE PULMONARY DISEASE, UNSPECIFIED: ICD-10-CM

## 2025-09-03 DIAGNOSIS — K64.8 OTHER HEMORRHOIDS: ICD-10-CM

## 2025-09-03 PROCEDURE — 99214 OFFICE O/P EST MOD 30 MIN: CPT

## 2025-09-03 PROCEDURE — G2211 COMPLEX E/M VISIT ADD ON: CPT

## (undated) DEVICE — ELCTR REM POLYHESIVE ADULT PT RETURN 15FT

## (undated) DEVICE — PROBE FIAPC CIRC O.D. 2.3MM/6.9FR LNTH 220CM/7.2FT

## (undated) DEVICE — SNARE OPTIMIZER SOFT POLYPECTOMY SMALL MINI OVAL

## (undated) DEVICE — ADAPTER ENDO CHNL SINGLE USE

## (undated) DEVICE — TUBING HYBRID CO2

## (undated) DEVICE — Device

## (undated) DEVICE — FORCEP RADIAL JAW 4 JUMBO 2.8MM 3.2MM 240CM ORANGE DISP

## (undated) DEVICE — TUBING ERBE CO2 OLYMPUS CONNECTOR

## (undated) DEVICE — KIT ENDO PROCEDURE CUST W/VLV

## (undated) DEVICE — SNARE POLYP SINGUL LG OVAL 230CM

## (undated) DEVICE — FORCEP BIOPSY CAPTURA HOT 2.4MM 230CM DISP

## (undated) DEVICE — FORCEP RADIAL JAW 4 W NDL 2.4MM 2.8MM 240CM ORANGE DISP